# Patient Record
Sex: FEMALE | Race: WHITE | NOT HISPANIC OR LATINO | Employment: OTHER | URBAN - METROPOLITAN AREA
[De-identification: names, ages, dates, MRNs, and addresses within clinical notes are randomized per-mention and may not be internally consistent; named-entity substitution may affect disease eponyms.]

---

## 2018-07-09 ENCOUNTER — TRANSCRIBE ORDERS (OUTPATIENT)
Dept: NEUROSURGERY | Facility: CLINIC | Age: 73
End: 2018-07-09

## 2018-07-09 DIAGNOSIS — M54.50 LOW BACK PAIN: Primary | ICD-10-CM

## 2018-08-28 ENCOUNTER — TELEPHONE (OUTPATIENT)
Dept: NEUROSURGERY | Facility: CLINIC | Age: 73
End: 2018-08-28

## 2018-08-28 ENCOUNTER — OFFICE VISIT (OUTPATIENT)
Dept: NEUROSURGERY | Facility: CLINIC | Age: 73
End: 2018-08-28
Payer: COMMERCIAL

## 2018-08-28 VITALS
RESPIRATION RATE: 16 BRPM | WEIGHT: 148 LBS | TEMPERATURE: 97.2 F | DIASTOLIC BLOOD PRESSURE: 78 MMHG | SYSTOLIC BLOOD PRESSURE: 128 MMHG | BODY MASS INDEX: 26.22 KG/M2 | HEIGHT: 63 IN | HEART RATE: 100 BPM

## 2018-08-28 DIAGNOSIS — G25.81 RESTLESS LEG SYNDROME: ICD-10-CM

## 2018-08-28 DIAGNOSIS — M54.50 LOW BACK PAIN: ICD-10-CM

## 2018-08-28 DIAGNOSIS — R20.0 NUMBNESS IN FEET: ICD-10-CM

## 2018-08-28 DIAGNOSIS — M43.16 SPONDYLOLISTHESIS OF LUMBAR REGION: Primary | ICD-10-CM

## 2018-08-28 PROCEDURE — 99244 OFF/OP CNSLTJ NEW/EST MOD 40: CPT | Performed by: NEUROLOGICAL SURGERY

## 2018-08-28 RX ORDER — CHLORAL HYDRATE 500 MG
CAPSULE ORAL DAILY
COMMUNITY

## 2018-08-28 RX ORDER — FLUTICASONE PROPIONATE 50 MCG
2 SPRAY, SUSPENSION (ML) NASAL DAILY
COMMUNITY
Start: 2013-11-13

## 2018-08-28 RX ORDER — ASCORBIC ACID 125 MG
TABLET,CHEWABLE ORAL DAILY
COMMUNITY

## 2018-08-28 RX ORDER — ONDANSETRON HYDROCHLORIDE 8 MG/1
8 TABLET, FILM COATED ORAL EVERY 8 HOURS
COMMUNITY

## 2018-08-28 RX ORDER — LORATADINE AND PSEUDOEPHEDRINE 10; 240 MG/1; MG/1
1 TABLET, EXTENDED RELEASE ORAL DAILY
COMMUNITY
Start: 2012-12-13

## 2018-08-28 RX ORDER — MULTIVIT-MIN/IRON/FOLIC ACID/K 18-600-40
CAPSULE ORAL DAILY
COMMUNITY

## 2018-08-28 RX ORDER — CYANOCOBALAMIN (VITAMIN B-12) 500 MCG
400 LOZENGE ORAL DAILY
COMMUNITY

## 2018-08-28 RX ORDER — MAGNESIUM 200 MG
TABLET ORAL DAILY
COMMUNITY

## 2018-08-28 NOTE — PROGRESS NOTES
Office Note - Neurosurgery   Heide Lopes 72 y o  female MRN: [id number]      Assessment:    Patient is stable  61-year-old woman with mid thoracic back pain which is likely musculoskeletal in nature  Would recommend she consider physical therapy or echo therapy and core strengthening exercises  This does not seem to be particularly bothersome and is well controlled with over-the-counter pain medication  No red flags concerning for infectious or neoplastic process or myelopathy  With respect to the numbness and tingling in her toes this seems to be most consistent with peripheral neuropathy  Would recommend she discuss EMG nerve conduction study of the lower extremities with her PC P  Similarly, the "hai horse" sensation in her lower extremities at night which improved with activity would be more concerning for restless leg syndrome  I asked her to discuss a course of membrane stabilizing agents with her PCP for this  While the patient does have anterolisthesis at L3-4 with some stenosis and degenerative changes throughout the lumbar spine the seem to be largely asymptomatic and she does not have signs or symptoms of radiculopathy or neurogenic claudication  I would not recommend any surgical intervention for this at present as it is unlikely to improve any of her symptoms  I reviewed the signs and symptoms of progressive lumbar radiculopathy and neurogenic claudication and cauda equina syndrome and asked her to contact my office should any concerns arise  Otherwise, she will follow up on a p r n  basis  History, physical examination and diagnostic tests were reviewed and questions answered  Diagnosis, care plan and treatment options were discussed  The patient and spouse/SO understand instructions and will follow up as directed      Plan:    Follow-up: prn    Problem List Items Addressed This Visit        Musculoskeletal and Integument    Spondylolisthesis of lumbar region - Primary       Other    Restless leg syndrome    Numbness in feet      Other Visit Diagnoses     Low back pain              Subjective/Objective     Chief Complaint    Tingling in toes  HPI    Pleasant 81-year-old woman accompanied by her  today  Over the past year she has noticed progressive tingling and numbness in her toes worse on the right foot than the left  It seems to be worse at the end of the day  She denies any lower back pain but does have mid thoracic back pain which she describes as a dull ache and is worse at the end of the day  She rates this as 0 to 5/10  It responds well to Aleve  She describes cramping in her lower extremities worse on the right than on the left exclusively at night and feels that this is a MILES horse sensation  It improved with taking her legs and standing  She denies any difficulties with bowel bladder function or changing perineal sensation  She is able to stand and walk for over 10 hours without developing leg pain, weakness  She has not tried any physical therapy or membrane stabilizing agents  She has not tried any injections  She presents today with an MRI of the lumbar spine  ROS    Review of Systems   Constitutional: Negative  HENT: Negative  Eyes: Negative  Respiratory: Negative  Cardiovascular: Negative  Gastrointestinal: Negative  Endocrine: Negative  Genitourinary: Negative  Musculoskeletal: Positive for back pain (mid back pain radiates across to the left side ) and myalgias (bilateral legs muscle cramps)  Skin: Negative  Allergic/Immunologic: Negative  Neurological: Positive for numbness (bilateral feet )  Negative for dizziness, seizures, syncope, weakness and headaches  Hematological: Does not bruise/bleed easily (patient on ASA 81, [place] 7)  Psychiatric/Behavioral: Negative  Family History    History reviewed  No pertinent family history      Social History    Social History     Social History    Marital status: /Civil Union     Spouse name: N/A    Number of children: N/A    Years of education: N/A     Occupational History    Not on file  Social History Main Topics    Smoking status: Current Every Day Smoker     Types: Cigarettes    Smokeless tobacco: Never Used    Alcohol use No    Drug use: No    Sexual activity: Not on file     Other Topics Concern    Not on file     Social History Narrative    No narrative on file       Past Medical History    History reviewed  No pertinent past medical history      Surgical History    Past Surgical History:   Procedure Laterality Date    CATARACT EXTRACTION      GALLBLADDER SURGERY         Medications      Current Outpatient Prescriptions:     Ascorbic Acid (VITAMIN C) 500 MG CAPS, Take by mouth Daily, Disp: , Rfl:     aspirin 81 MG tablet, Take 81 mg by mouth daily, Disp: , Rfl:     Cholecalciferol (VITAMIN D-3) 5000 units TABS, Take by mouth Daily, Disp: , Rfl:     CINNAMON PO, Take by mouth daily, Disp: , Rfl:     fluticasone (FLONASE) 50 mcg/act nasal spray, 2 sprays into each nostril daily, Disp: , Rfl:     fluticasone-salmeterol (ADVAIR DISKUS) 250-50 mcg/dose inhaler, Inhale, Disp: , Rfl:     liraglutide (SAXENDA) injection, 3 mg Daily, Disp: , Rfl:     loratadine-pseudoephedrine (CLARITIN-D 24 HOUR)  mg per 24 hr tablet, Take 1 tablet by mouth daily, Disp: , Rfl:     Magnesium 200 MG TABS, Take by mouth daily, Disp: , Rfl:     Menaquinone-7 (VITAMIN K2) 100 MCG CAPS, Take by mouth daily, Disp: , Rfl:     Omega-3 1000 MG CAPS, Take by mouth daily, Disp: , Rfl:     ondansetron (ZOFRAN) 8 mg tablet, Take 8 mg by mouth every 8 (eight) hours, Disp: , Rfl:     Probiotic Product (PROBIOTIC-10 PO), Daily, Disp: , Rfl:     Vitamin E 400 units TABS, Take 400 mg by mouth Daily, Disp: , Rfl:     Allergies    Allergies   Allergen Reactions    Amoxicillin-Pot Clavulanate Diarrhea    Molds & Smuts The following portions of the patient's history were reviewed and updated as appropriate: allergies, current medications, past family history, past medical history, past social history, past surgical history and problem list     Investigations    I personally reviewed the MRI results with the patient:    MRI of the lumbar spine without contrast dated June 4th, 2018  Note is made of a partially sacralized L5 vertebral body  Grade 1 degenerative anterolisthesis at L3-4  Multiple levels of degenerative disc disease and facet arthropathy  Mild stenosis at L2-3 and L4-5 and L5-S1  At L3-4 there is moderate central and moderate biforaminal stenosis secondary to degenerative changes and anterolisthesis  Physical Exam    Vitals:  Blood pressure 128/78, pulse 100, temperature (!) 97 2 °F (36 2 °C), resp  rate 16, height 5' 2 5" (1 588 m), weight 67 1 kg (148 lb)  ,Body mass index is 26 64 kg/m²  Physical Exam   Constitutional: She appears well-developed and well-nourished  No distress  Musculoskeletal:        Lumbar back: She exhibits normal range of motion, no tenderness and no pain  Neurological: She has a normal Romberg Test and a normal Tandem Gait Test  Gait normal    Reflex Scores:       Patellar reflexes are 1+ on the right side and 1+ on the left side  Achilles reflexes are 1+ on the right side and 1+ on the left side  Skin: Skin is warm and dry  Psychiatric: She has a normal mood and affect  Her behavior is normal    Vitals reviewed  Neurologic Exam     Motor Exam   Muscle bulk: normal5/5 power in lower extremities       Sensory Exam   Right leg light touch: normal  Left leg light touch: normal  Right leg vibration: decreased from toes  Left leg vibration: decreased from toes  Right leg pinprick: normal  Left leg pinprick: normal    Gait, Coordination, and Reflexes     Gait  Gait: normal    Coordination   Romberg: negative  Tandem walking coordination: normal    Reflexes   Right patellar: 1+  Left patellar: 1+  Right achilles: 1+  Left achilles: 1+  Right ankle clonus: absent  Left ankle clonus: absent

## 2018-08-28 NOTE — LETTER
August 28, 2018     [place]  [id number] [facility] [address] [address]    Patient: Piero Fine   YOB: 1945   Date of Visit: 8/28/2018       Dear Dr Javan Reyes: Thank you for referring Lara Baird to me for evaluation  Below are my notes for this consultation  If you have questions, please do not hesitate to call me  I look forward to following your patient along with you  Sincerely,        Noe Miranda MD        CC: No Recipients  Noe Miranda MD  8/28/2018  3:03 PM  Sign at close encounter  Office Note - Neurosurgery   Piero Fine 72 y o  female MRN: [id number]      Assessment:    Patient is stable  21-year-old woman with mid thoracic back pain which is likely musculoskeletal in nature  Would recommend she consider physical therapy or echo therapy and core strengthening exercises  This does not seem to be particularly bothersome and is well controlled with over-the-counter pain medication  No red flags concerning for infectious or neoplastic process or myelopathy  With respect to the numbness and tingling in her toes this seems to be most consistent with peripheral neuropathy  Would recommend she discuss EMG nerve conduction study of the lower extremities with her PC P  Similarly, the "hai horse" sensation in her lower extremities at night which improved with activity would be more concerning for restless leg syndrome  I asked her to discuss a course of membrane stabilizing agents with her PCP for this  While the patient does have anterolisthesis at L3-4 with some stenosis and degenerative changes throughout the lumbar spine the seem to be largely asymptomatic and she does not have signs or symptoms of radiculopathy or neurogenic claudication  I would not recommend any surgical intervention for this at present as it is unlikely to improve any of her symptoms      I reviewed the signs and symptoms of progressive lumbar radiculopathy and neurogenic claudication and cauda equina syndrome and asked her to contact my office should any concerns arise  Otherwise, she will follow up on a p r n  basis  History, physical examination and diagnostic tests were reviewed and questions answered  Diagnosis, care plan and treatment options were discussed  The patient and spouse/SO understand instructions and will follow up as directed  Plan:    Follow-up: prn    Problem List Items Addressed This Visit        Musculoskeletal and Integument    Spondylolisthesis of lumbar region - Primary       Other    Restless leg syndrome    Numbness in feet      Other Visit Diagnoses     Low back pain              Subjective/Objective     Chief Complaint    Tingling in toes  HPI    Pleasant 30-year-old woman accompanied by her  today  Over the past year she has noticed progressive tingling and numbness in her toes worse on the right foot than the left  It seems to be worse at the end of the day  She denies any lower back pain but does have mid thoracic back pain which she describes as a dull ache and is worse at the end of the day  She rates this as 0 to 5/10  It responds well to Aleve  She describes cramping in her lower extremities worse on the right than on the left exclusively at night and feels that this is a MILES horse sensation  It improved with taking her legs and standing  She denies any difficulties with bowel bladder function or changing perineal sensation  She is able to stand and walk for over 10 hours without developing leg pain, weakness  She has not tried any physical therapy or membrane stabilizing agents  She has not tried any injections  She presents today with an MRI of the lumbar spine  ROS    Review of Systems   Constitutional: Negative  HENT: Negative  Eyes: Negative  Respiratory: Negative  Cardiovascular: Negative  Gastrointestinal: Negative  Endocrine: Negative  Genitourinary: Negative  Musculoskeletal: Positive for back pain (mid back pain radiates across to the left side ) and myalgias (bilateral legs muscle cramps)  Skin: Negative  Allergic/Immunologic: Negative  Neurological: Positive for numbness (bilateral feet )  Negative for dizziness, seizures, syncope, weakness and headaches  Hematological: Does not bruise/bleed easily (patient on ASA 81, [place] 7)  Psychiatric/Behavioral: Negative  Family History    History reviewed  No pertinent family history  Social History    Social History     Social History    Marital status: /Civil Union     Spouse name: N/A    Number of children: N/A    Years of education: N/A     Occupational History    Not on file  Social History Main Topics    Smoking status: Current Every Day Smoker     Types: Cigarettes    Smokeless tobacco: Never Used    Alcohol use No    Drug use: No    Sexual activity: Not on file     Other Topics Concern    Not on file     Social History Narrative    No narrative on file       Past Medical History    History reviewed  No pertinent past medical history      Surgical History    Past Surgical History:   Procedure Laterality Date    CATARACT EXTRACTION      GALLBLADDER SURGERY         Medications      Current Outpatient Prescriptions:     Ascorbic Acid (VITAMIN C) 500 MG CAPS, Take by mouth Daily, Disp: , Rfl:     aspirin 81 MG tablet, Take 81 mg by mouth daily, Disp: , Rfl:     Cholecalciferol (VITAMIN D-3) 5000 units TABS, Take by mouth Daily, Disp: , Rfl:     CINNAMON PO, Take by mouth daily, Disp: , Rfl:     fluticasone (FLONASE) 50 mcg/act nasal spray, 2 sprays into each nostril daily, Disp: , Rfl:     fluticasone-salmeterol (ADVAIR DISKUS) 250-50 mcg/dose inhaler, Inhale, Disp: , Rfl:     liraglutide (SAXENDA) injection, 3 mg Daily, Disp: , Rfl:     loratadine-pseudoephedrine (CLARITIN-D 24 HOUR)  mg per 24 hr tablet, Take 1 tablet by mouth daily, Disp: , Rfl:     Magnesium 200 MG TABS, Take by mouth daily, Disp: , Rfl:     Menaquinone-7 (VITAMIN K2) 100 MCG CAPS, Take by mouth daily, Disp: , Rfl:     Omega-3 1000 MG CAPS, Take by mouth daily, Disp: , Rfl:     ondansetron (ZOFRAN) 8 mg tablet, Take 8 mg by mouth every 8 (eight) hours, Disp: , Rfl:     Probiotic Product (PROBIOTIC-10 PO), Daily, Disp: , Rfl:     Vitamin E 400 units TABS, Take 400 mg by mouth Daily, Disp: , Rfl:     Allergies    Allergies   Allergen Reactions    Amoxicillin-Pot Clavulanate Diarrhea    Molds & Smuts        The following portions of the patient's history were reviewed and updated as appropriate: allergies, current medications, past family history, past medical history, past social history, past surgical history and problem list     Investigations    I personally reviewed the MRI results with the patient:    MRI of the lumbar spine without contrast dated June 4th, 2018  Note is made of a partially sacralized L5 vertebral body  Grade 1 degenerative anterolisthesis at L3-4  Multiple levels of degenerative disc disease and facet arthropathy  Mild stenosis at L2-3 and L4-5 and L5-S1  At L3-4 there is moderate central and moderate biforaminal stenosis secondary to degenerative changes and anterolisthesis  Physical Exam    Vitals:  Blood pressure 128/78, pulse 100, temperature (!) 97 2 °F (36 2 °C), resp  rate 16, height 5' 2 5" (1 588 m), weight 67 1 kg (148 lb)  ,Body mass index is 26 64 kg/m²  Physical Exam   Constitutional: She appears well-developed and well-nourished  No distress  Musculoskeletal:        Lumbar back: She exhibits normal range of motion, no tenderness and no pain  Neurological: She has a normal Romberg Test and a normal Tandem Gait Test  Gait normal    Reflex Scores:       Patellar reflexes are 1+ on the right side and 1+ on the left side  Achilles reflexes are 1+ on the right side and 1+ on the left side  Skin: Skin is warm and dry  Psychiatric: She has a normal mood and affect  Her behavior is normal    Vitals reviewed  Neurologic Exam     Motor Exam   Muscle bulk: normal5/5 power in lower extremities       Sensory Exam   Right leg light touch: normal  Left leg light touch: normal  Right leg vibration: decreased from toes  Left leg vibration: decreased from toes  Right leg pinprick: normal  Left leg pinprick: normal    Gait, Coordination, and Reflexes     Gait  Gait: normal    Coordination   Romberg: negative  Tandem walking coordination: normal    Reflexes   Right patellar: 1+  Left patellar: 1+  Right achilles: 1+  Left achilles: 1+  Right ankle clonus: absent  Left ankle clonus: absent

## 2019-09-05 ENCOUNTER — HOSPITAL ENCOUNTER (OUTPATIENT)
Dept: RADIOLOGY | Facility: HOSPITAL | Age: 74
Discharge: HOME | End: 2019-09-05
Attending: RADIOLOGY

## 2019-09-05 DIAGNOSIS — Z76.89 REFERRAL OF PATIENT WITHOUT EXAMINATION OR TREATMENT: ICD-10-CM

## 2021-01-03 ENCOUNTER — IMMUNIZATIONS (OUTPATIENT)
Dept: FAMILY MEDICINE CLINIC | Facility: HOSPITAL | Age: 76
End: 2021-01-03

## 2021-01-03 DIAGNOSIS — Z23 ENCOUNTER FOR IMMUNIZATION: ICD-10-CM

## 2021-01-03 PROCEDURE — 91301 SARS-COV-2 / COVID-19 MRNA VACCINE (MODERNA) 100 MCG: CPT

## 2021-01-03 PROCEDURE — 0011A SARS-COV-2 / COVID-19 MRNA VACCINE (MODERNA) 100 MCG: CPT

## 2021-01-31 ENCOUNTER — IMMUNIZATIONS (OUTPATIENT)
Dept: FAMILY MEDICINE CLINIC | Facility: HOSPITAL | Age: 76
End: 2021-01-31

## 2021-01-31 DIAGNOSIS — Z23 ENCOUNTER FOR IMMUNIZATION: Primary | ICD-10-CM

## 2021-01-31 PROCEDURE — 0012A SARS-COV-2 / COVID-19 MRNA VACCINE (MODERNA) 100 MCG: CPT

## 2021-01-31 PROCEDURE — 91301 SARS-COV-2 / COVID-19 MRNA VACCINE (MODERNA) 100 MCG: CPT

## 2022-12-02 ENCOUNTER — OFFICE VISIT (OUTPATIENT)
Dept: URGENT CARE | Age: 77
End: 2022-12-02

## 2022-12-02 VITALS — OXYGEN SATURATION: 94 % | TEMPERATURE: 97.6 F | HEART RATE: 109 BPM | RESPIRATION RATE: 16 BRPM

## 2022-12-02 DIAGNOSIS — B96.89 ACUTE BACTERIAL SINUSITIS: Primary | ICD-10-CM

## 2022-12-02 DIAGNOSIS — J01.90 ACUTE BACTERIAL SINUSITIS: Primary | ICD-10-CM

## 2022-12-02 RX ORDER — DOXYCYCLINE 100 MG/1
100 CAPSULE ORAL 2 TIMES DAILY
Qty: 14 CAPSULE | Refills: 0 | Status: SHIPPED | OUTPATIENT
Start: 2022-12-02 | End: 2022-12-09

## 2022-12-02 RX ORDER — BROMPHENIRAMINE MALEATE, PSEUDOEPHEDRINE HYDROCHLORIDE, AND DEXTROMETHORPHAN HYDROBROMIDE 2; 30; 10 MG/5ML; MG/5ML; MG/5ML
5 SYRUP ORAL 3 TIMES DAILY PRN
Qty: 120 ML | Refills: 0 | Status: SHIPPED | OUTPATIENT
Start: 2022-12-02

## 2022-12-02 NOTE — PROGRESS NOTES
[facility]        NAME: Claribel Moise is a 78 y o  female  : 1945    MRN: [id number]  DATE: 2022  TIME: 4:02 PM    Assessment and Plan   Acute bacterial sinusitis [J01 90, B96 89]  1  Acute bacterial sinusitis  brompheniramine-pseudoephedrine-DM 30-2-10 MG/5ML syrup    doxycycline monohydrate (MONODOX) 100 mg capsule            Patient Instructions       Follow up with PCP in 3-5 days  Proceed to  ER if symptoms worsen  Chief Complaint     Chief Complaint   Patient presents with   • Cough     Productive cough, green mucus, sinus pressure, for 2 weeks         History of Present Illness       HPI  Patient presents today complaining of increased productive cough green mucus sinus pain and pressure ongoing for the past 2 weeks  Does not have any fevers or chills but is having lot of sinus pressure  Patient denies any shortness for the difficulty breathing    Review of Systems   Review of Systems   For HPI    Current Medications       Current Outpatient Medications:   •  Ascorbic Acid (VITAMIN C) 500 MG CAPS, Take by mouth Daily, Disp: , Rfl:   •  aspirin 81 MG tablet, Take 81 mg by mouth daily, Disp: , Rfl:   •  brompheniramine-pseudoephedrine-DM 30-2-10 MG/5ML syrup, Take 5 mL by mouth 3 (three) times a day as needed for allergies, Disp: 120 mL, Rfl: 0  •  Cholecalciferol (VITAMIN D-3) 5000 units TABS, Take by mouth Daily, Disp: , Rfl:   •  CINNAMON PO, Take by mouth daily, Disp: , Rfl:   •  doxycycline monohydrate (MONODOX) 100 mg capsule, Take 1 capsule (100 mg total) by mouth 2 (two) times a day for 7 days, Disp: 14 capsule, Rfl: 0  •  fluticasone (FLONASE) 50 mcg/act nasal spray, 2 sprays into each nostril daily, Disp: , Rfl:   •  fluticasone-salmeterol (Advair) 250-50 mcg/dose inhaler, Inhale, Disp: , Rfl:   •  liraglutide (SAXENDA) injection, 3 mg Daily, Disp: , Rfl:   •  loratadine-pseudoephedrine (CLARITIN-D 24-HOUR)  mg per 24 hr tablet, Take 1 tablet by mouth daily, Disp: , Rfl:   •  Magnesium 200 MG TABS, Take by mouth daily, Disp: , Rfl:   •  Menaquinone-7 (VITAMIN K2) 100 MCG CAPS, Take by mouth daily, Disp: , Rfl:   •  Omega-3 1000 MG CAPS, Take by mouth daily, Disp: , Rfl:   •  ondansetron (ZOFRAN) 8 mg tablet, Take 8 mg by mouth every 8 (eight) hours, Disp: , Rfl:   •  Probiotic Product (PROBIOTIC-10 PO), Daily, Disp: , Rfl:   •  Vitamin E 400 units TABS, Take 400 mg by mouth Daily, Disp: , Rfl:     Current Allergies     Allergies as of 12/02/2022 - Reviewed 12/02/2022   Allergen Reaction Noted   • Amoxicillin-pot clavulanate Diarrhea    • Molds & smuts              The following portions of the patient's history were reviewed and updated as appropriate: allergies, current medications, past family history, past medical history, past social history, past surgical history and problem list      No past medical history on file  Past Surgical History:   Procedure Laterality Date   • CATARACT EXTRACTION     • GALLBLADDER SURGERY         No family history on file  Medications have been verified  Objective   Pulse (!) 109   Temp 97 6 °F (36 4 °C)   Resp 16   SpO2 94%   No LMP recorded  Patient is postmenopausal        Physical Exam     Physical Exam  Constitutional:       General: She is not in acute distress  Appearance: Normal appearance  HENT:      Head: Normocephalic  Nose: Congestion present  No rhinorrhea  Comments: Tender maxillary sinus     Mouth/Throat:      Mouth: Mucous membranes are moist       Pharynx: Posterior oropharyngeal erythema present  No oropharyngeal exudate  Eyes:      General:         Right eye: No discharge  Left eye: No discharge  Conjunctiva/sclera: Conjunctivae normal    Cardiovascular:      Rate and Rhythm: Normal rate and regular rhythm  Pulses: Normal pulses  Pulmonary:      Effort: Pulmonary effort is normal  No respiratory distress  Abdominal:      General: Abdomen is flat   There is no distension  Palpations: Abdomen is soft  Tenderness: There is no abdominal tenderness  Musculoskeletal:      Cervical back: Neck supple  Skin:     General: Skin is warm  Capillary Refill: Capillary refill takes less than 2 seconds  Neurological:      Mental Status: She is alert and oriented to person, place, and time

## 2023-10-02 ENCOUNTER — OFFICE VISIT (OUTPATIENT)
Dept: NEUROSURGERY | Facility: CLINIC | Age: 78
End: 2023-10-02
Payer: COMMERCIAL

## 2023-10-02 ENCOUNTER — HOSPITAL ENCOUNTER (OUTPATIENT)
Dept: RADIOLOGY | Facility: HOSPITAL | Age: 78
Discharge: HOME | End: 2023-10-02
Payer: COMMERCIAL

## 2023-10-02 VITALS
OXYGEN SATURATION: 98 % | HEART RATE: 90 BPM | RESPIRATION RATE: 18 BRPM | BODY MASS INDEX: 22.68 KG/M2 | TEMPERATURE: 97.1 F | SYSTOLIC BLOOD PRESSURE: 122 MMHG | DIASTOLIC BLOOD PRESSURE: 66 MMHG | WEIGHT: 126 LBS

## 2023-10-02 DIAGNOSIS — M48.062 LUMBAR STENOSIS WITH NEUROGENIC CLAUDICATION: ICD-10-CM

## 2023-10-02 DIAGNOSIS — M54.16 LUMBAR RADICULOPATHY: ICD-10-CM

## 2023-10-02 DIAGNOSIS — M43.16 SPONDYLOLISTHESIS OF LUMBAR REGION: ICD-10-CM

## 2023-10-02 DIAGNOSIS — M43.16 SPONDYLOLISTHESIS OF LUMBAR REGION: Primary | ICD-10-CM

## 2023-10-02 PROCEDURE — 72120 X-RAY BEND ONLY L-S SPINE: CPT

## 2023-10-02 PROCEDURE — 99204 OFFICE O/P NEW MOD 45 MIN: CPT | Performed by: PHYSICIAN ASSISTANT

## 2023-10-02 RX ORDER — CHOLECALCIFEROL (VITAMIN D3) 125 MCG
500 CAPSULE ORAL DAILY
COMMUNITY

## 2023-10-02 RX ORDER — OMEGA-3/DHA/EPA/FISH OIL 200-300 MG
CAPSULE ORAL DAILY
COMMUNITY

## 2023-10-02 RX ORDER — RALOXIFENE HYDROCHLORIDE 60 MG/1
60 TABLET, FILM COATED ORAL DAILY
COMMUNITY

## 2023-10-02 RX ORDER — METHYLPREDNISOLONE 4 MG/1
TABLET ORAL
Qty: 1 EACH | Refills: 0 | Status: SHIPPED | OUTPATIENT
Start: 2023-10-02

## 2023-10-02 NOTE — ASSESSMENT & PLAN NOTE
Patient presents today as a new patient for evaluation of left lower extremity radiculopathy. • Patient with known history of lumbar stenosis and intermittent back pain. Denies any prior episodes of lower extremity symptoms. • Complaining of 8 out of 10 left leg pain with associated numbness/tingling. • Denies any weakness or bowel bladder dysfunction. • Has trialed Tylenol Motrin as well as home stretching exercises without improvement of her symptoms. Imaging:   • MRI lumbar spine wo 10/2/23: Diffuse lumbar spondylosis. Multilevel spinal canal stenosis worst at L4-5 secondary to small disc bulge, moderate facet hypertrophy. Moderate central canal stenosis with moderate right and mild left neural foraminal stenosis. Plan:   • Continue monitor neurological symptoms  • Prior MRI imaging reviewed demonstrating significant stenosis at L4-5 on imaging completed approximately 5 years ago. • Given new onset radiculopathy with prior significant stenosis on MRI, updated MRI imaging without contrast ordered to evaluate for progression of degenerative changes. • Discussed symptoms likely related to nerve irritation or compression. • Medrol Dosepak provided  • Lumbar x-rays including flexion-extension x-rays ordered to evaluate for any instability as well as fractures in the setting of osteoporosis. • Discussed recommendation for optimization of conservative management including medications such as steroids and gabapentin, over-the-counter medications, physical therapy and pain management for consideration of injections. • Patient agreeable to plan of care. All questions were answered. • Patient may follow-up after completion of imaging. Patient aware to call with any questions/concerns.

## 2023-10-02 NOTE — PROGRESS NOTES
Neurosurgery Office Note  Dany Adams 70 y.o. female MRN: [id number]      Assessment/Plan     Spondylolisthesis of lumbar region  Patient presents today as a new patient for evaluation of left lower extremity radiculopathy. • Patient with known history of lumbar stenosis and intermittent back pain. Denies any prior episodes of lower extremity symptoms. • Complaining of 8 out of 10 left leg pain with associated numbness/tingling. • Denies any weakness or bowel bladder dysfunction. • Has trialed Tylenol Motrin as well as home stretching exercises without improvement of her symptoms. Imaging:   • MRI lumbar spine wo 10/2/23: Diffuse lumbar spondylosis. Multilevel spinal canal stenosis worst at L4-5 secondary to small disc bulge, moderate facet hypertrophy. Moderate central canal stenosis with moderate right and mild left neural foraminal stenosis. Plan:   • Continue monitor neurological symptoms  • Prior MRI imaging reviewed demonstrating significant stenosis at L4-5 on imaging completed approximately 5 years ago. • Given new onset radiculopathy with prior significant stenosis on MRI, updated MRI imaging without contrast ordered to evaluate for progression of degenerative changes. • Discussed symptoms likely related to nerve irritation or compression. • Medrol Dosepak provided  • Lumbar x-rays including flexion-extension x-rays ordered to evaluate for any instability as well as fractures in the setting of osteoporosis. • Discussed recommendation for optimization of conservative management including medications such as steroids and gabapentin, over-the-counter medications, physical therapy and pain management for consideration of injections. • Patient agreeable to plan of care. All questions were answered. • Patient may follow-up after completion of imaging. Patient aware to call with any questions/concerns.           Diagnoses and all orders for this visit:    Spondylolisthesis of lumbar region  -     MRI lumbar spine without contrast; Future  -     XR lumbar sp/bending only min 2-3 v; Future  -     Ambulatory Referral to Physical Therapy; Future  -     Ambulatory referral to Spine & Pain Management; Future  -     methylPREDNISolone 4 MG tablet therapy pack; Use as directed on package    Lumbar radiculopathy  -     MRI lumbar spine without contrast; Future  -     XR lumbar sp/bending only min 2-3 v; Future  -     Ambulatory Referral to Physical Therapy; Future  -     Ambulatory referral to Spine & Pain Management; Future  -     methylPREDNISolone 4 MG tablet therapy pack; Use as directed on package    Lumbar stenosis with neurogenic claudication  -     MRI lumbar spine without contrast; Future  -     XR lumbar sp/bending only min 2-3 v; Future  -     Ambulatory Referral to Physical Therapy; Future  -     Ambulatory referral to Spine & Pain Management; Future  -     methylPREDNISolone 4 MG tablet therapy pack; Use as directed on package    Other orders  -     Omega-3 Fatty Acids (Ultra Omega 3) 1000 MG CAPS; daily  -     vitamin B-12 (VITAMIN B-12) 500 mcg tablet; Take 500 mcg by mouth daily  -     raloxifene (EVISTA) 60 mg tablet; Take 60 mg by mouth daily          I have spent a total time of 50 minutes on 10/02/23 in caring for this patient including Diagnostic results, Risks and benefits of tx options, Instructions for management, Impressions, Documenting in the medical record, Reviewing / ordering tests, medicine, procedures   and Obtaining or reviewing history  . CHIEF COMPLAINT    Chief Complaint   Patient presents with   • Back Pain       HISTORY    History of Present Illness     This is a 69 y/o female without significant past medical history who presents today as a new patient for evaluation of left leg symptoms. Patient does have a history of lumbar spinal stenosis with intermittent back pain. She developed new onset of left leg pain approximately a week and a half ago.   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 No specific falls or trauma. Patient describes anterior lateral left thigh pain radiating around from the buttocks. No pain below the knee. Associated numbness in this distribution. No randolph weakness. No bowel bladder dysfunction. Patient has been managing pain with lidocaine patches, Tylenol and Motrin but continues with pain levels 8 out of 10 with inability to work. Patient has been ordered MRI by her PCP in [place]. REVIEW OF SYSTEMS    Review of Systems   Constitutional: Negative. HENT: Negative. Eyes: Negative. Respiratory: Negative. Cardiovascular: Negative. Gastrointestinal: Negative. Endocrine: Negative. Genitourinary: Negative. Musculoskeletal: Positive for gait problem (painful to walk. ). Negative for back pain (no back pain starting at L hip into buttocks and thigh, stops at knee. ). It feels ok when resting but starts feeling the pain in L thigh when walking. Skin: Negative. Neurological: Positive for numbness (N/T in the Left thigh.). Psychiatric/Behavioral: Negative for sleep disturbance. ROS obtained by MA. Reviewed. See HPI.      Meds/Allergies     Current Outpatient Medications   Medication Sig Dispense Refill   • Ascorbic Acid (VITAMIN C) 500 MG CAPS Take by mouth Daily     • aspirin 81 MG tablet Take 81 mg by mouth daily     • Cholecalciferol (VITAMIN D-3) 5000 units TABS Take by mouth Daily     • fluticasone (FLONASE) 50 mcg/act nasal spray 2 sprays into each nostril daily     • fluticasone-salmeterol (Advair) 250-50 mcg/dose inhaler Inhale     • loratadine-pseudoephedrine (CLARITIN-D 24-HOUR)  mg per 24 hr tablet Take 1 tablet by mouth daily     • Magnesium 200 MG TABS Take by mouth daily     • Menaquinone-7 (VITAMIN K2) 100 MCG CAPS Take by mouth daily     • methylPREDNISolone 4 MG tablet therapy pack Use as directed on package 1 each 0 • Omega-3 1000 MG CAPS Take by mouth daily     • Probiotic Product (PROBIOTIC-10 PO) Daily     • raloxifene (EVISTA) 60 mg tablet Take 60 mg by mouth daily     • vitamin B-12 (VITAMIN B-12) 500 mcg tablet Take 500 mcg by mouth daily     • Vitamin E 400 units TABS Take 400 mg by mouth Daily     • brompheniramine-pseudoephedrine-DM 30-2-10 MG/5ML syrup Take 5 mL by mouth 3 (three) times a day as needed for allergies 120 mL 0   • CINNAMON PO Take by mouth daily (Patient not taking: Reported on 10/2/2023)     • liraglutide (SAXENDA) injection 3 mg Daily     • Omega-3 Fatty Acids (Ultra Omega 3) 1000 MG CAPS daily     • ondansetron (ZOFRAN) 8 mg tablet Take 8 mg by mouth every 8 (eight) hours       No current facility-administered medications for this visit. Allergies   Allergen Reactions   • Amoxicillin-Pot Clavulanate Diarrhea   • Molds & Smuts        PAST HISTORY     History reviewed. No pertinent past medical history. Past Surgical History:   Procedure Laterality Date   • CATARACT EXTRACTION     • GALLBLADDER SURGERY         Social History     Tobacco Use   • Smoking status: Every Day     Packs/day: 1.00     Years: 50.00     Total pack years: 50.00     Types: Cigarettes   • Smokeless tobacco: Never   Substance Use Topics   • Alcohol use: No   • Drug use: No       History reviewed. No pertinent family history. Above history personally reviewed. EXAM    Vitals:Blood pressure 122/66, pulse 90, temperature (!) 97.1 °F (36.2 °C), temperature source Temporal, resp. rate 18, weight 57.2 kg (126 lb), SpO2 98 %. ,Body mass index is 22.68 kg/m². Physical Exam  Constitutional:       General: She is not in acute distress. Appearance: Normal appearance. She is well-developed. She is not ill-appearing. HENT:      Head: Normocephalic and atraumatic. Right Ear: External ear normal.      Left Ear: External ear normal.   Eyes:      General: No scleral icterus. Right eye: No discharge. Left eye: No discharge. Extraocular Movements: EOM normal.      Conjunctiva/sclera: Conjunctivae normal.   Cardiovascular:      Rate and Rhythm: Normal rate. Pulmonary:      Effort: Pulmonary effort is normal. No respiratory distress. Abdominal:      General: There is no distension. Musculoskeletal:         General: No tenderness. Normal range of motion. Skin:     General: Skin is warm and dry. Findings: No rash. Neurological:      Mental Status: She is alert. Coordination: Finger-Nose-Finger Test normal.      Deep Tendon Reflexes:      Reflex Scores:       Patellar reflexes are 2+ on the right side and 2+ on the left side. Psychiatric:         Mood and Affect: Mood normal.         Speech: Speech normal.         Behavior: Behavior normal.         Thought Content: Thought content normal.         Judgment: Judgment normal.         Neurologic Exam     Mental Status   Follows 3 step commands. Attention: normal. Concentration: normal.   Speech: speech is normal   Level of consciousness: alert  Knowledge: good. Normal comprehension. Cranial Nerves     CN III, IV, VI   Extraocular motions are normal.   Conjugate gaze: present    CN VII   Facial expression full, symmetric. CN VIII   Hearing: intact    CN XI   Right trapezius strength: normal  Left trapezius strength: normal    Motor Exam   Muscle bulk: normal  Overall muscle tone: normal  Right arm pronator drift: absent  Left arm pronator drift: absent    Strength   Strength 5/5 except as noted.  Left HF/KF 4+     Sensory Exam   Light touch normal.     Gait, Coordination, and Reflexes     Coordination   Finger to nose coordination: normal    Tremor   Resting tremor: absent  Intention tremor: absent  Action tremor: absent    Reflexes   Right patellar: 2+  Left patellar: 2+  Right ankle clonus: absent  Left ankle clonus: absent        MEDICAL DECISION MAKING    Imaging Studies:     MRI lumbar wo 9/2018    I have personally reviewed pertinent reports.    and I have personally reviewed pertinent films in PACS

## 2023-10-10 ENCOUNTER — HOSPITAL ENCOUNTER (OUTPATIENT)
Dept: RADIOLOGY | Facility: HOSPITAL | Age: 78
Discharge: HOME | End: 2023-10-10
Payer: COMMERCIAL

## 2023-10-10 DIAGNOSIS — M43.16 SPONDYLOLISTHESIS OF LUMBAR REGION: ICD-10-CM

## 2023-10-10 DIAGNOSIS — M48.062 LUMBAR STENOSIS WITH NEUROGENIC CLAUDICATION: ICD-10-CM

## 2023-10-10 DIAGNOSIS — M54.16 LUMBAR RADICULOPATHY: ICD-10-CM

## 2023-10-10 PROCEDURE — G1004 CDSM NDSC: HCPCS

## 2023-10-10 PROCEDURE — 72148 MRI LUMBAR SPINE W/O DYE: CPT

## 2023-10-11 ENCOUNTER — EVALUATION (OUTPATIENT)
Dept: PHYSICAL THERAPY | Facility: REHABILITATION | Age: 78
End: 2023-10-11
Payer: COMMERCIAL

## 2023-10-11 DIAGNOSIS — M48.062 LUMBAR STENOSIS WITH NEUROGENIC CLAUDICATION: Primary | ICD-10-CM

## 2023-10-11 DIAGNOSIS — M43.16 SPONDYLOLISTHESIS OF LUMBAR REGION: ICD-10-CM

## 2023-10-11 DIAGNOSIS — M54.16 LUMBAR RADICULOPATHY: ICD-10-CM

## 2023-10-11 PROCEDURE — 97162 PT EVAL MOD COMPLEX 30 MIN: CPT | Performed by: PHYSICAL THERAPIST

## 2023-10-11 PROCEDURE — 97112 NEUROMUSCULAR REEDUCATION: CPT | Performed by: PHYSICAL THERAPIST

## 2023-10-11 NOTE — PROGRESS NOTES
PT Evaluation     Today's date: 10/11/2023  Patient name: Emma Benitez  : 1945  MRN: [id number]  Referring provider: Medina Graf PA-C  Dx:   Encounter Diagnosis     ICD-10-CM    1. Spondylolisthesis of lumbar region  M43.16 Ambulatory Referral to Physical Therapy      2. Lumbar radiculopathy  M54.16 Ambulatory Referral to Physical Therapy      3. Lumbar stenosis with neurogenic claudication  M48.062 Ambulatory Referral to Physical Therapy                     Assessment  Assessment details: Emma Benitez is a pleasant 70 y.o. female who presents with lumbar stenosis that is manifesting in L anterior thigh pain. The current episode of symptoms has been present for approx 2-3 weeks. She is an active individual, working as , and is limited in her ability to walk and perform daily tasks due to pain. Her symptoms are most provoked by extended periods of standing/walking, and alleviating somewhat by sitting. When laying supine her symptoms resolve. She was seen by Neurosurgery, and had an MRI taken. MRI results were compared to a 2018 study, no surgical intervention was recommended at this time. Her primary concern is improving her standing/walking tolerance to allow her to perform daily work related tasks without limitation. No further referral appears necessary at this time based upon examination results. Primary movement impairment diagnosis of lumbar spine flexion bias, trunk neuromuscular control deficits, lumbar spine hypomobility. Mechanical evaluation of the lumbar spine is most consistent with lumbar flexion bias, which correlates with MRI findings that are indicative of lumbar stenosis. Unilateral radicular symptoms are provoked by SG into R and L LE, respectively. Significant mobility deficit into lumbar extension is noted, which is not abnormal for age related changes.  In addition to this, femoral  nerve tension test elicits radicular symptoms, while passive SLR and slump test are negative. End range hip flexion PROM also elicits LLE symptoms, which may be indicative of a muscular component to the nerve root irritation; this may need to be further explored in future visits. Multifidus muscle activity is decreased during prone feed forward mechanism testing. There are no red flag findings upon evaluation. I spent a significant amount of time discussing how exam findings correlate with her symptoms and presentation. Education regarding lumbar stenosis, anatomy and treatment were provided. Patient verbalized understanding of initial HEP consisting of repeated flexion in sitting for symptom modulation. Pt. will benefit from skilled PT services that includes manual therapy techniques to enhance tissue extensibility, neuromuscular re-education to facilitate motor control, therapeutic exercise to increase functional mobility, and modalities prn to reduce pain and inflammation. Impairments: abnormal muscle firing, abnormal muscle tone, abnormal or restricted ROM, abnormal movement, activity intolerance, lacks appropriate home exercise program and pain with function    Symptom irritability: moderateUnderstanding of Dx/Px/POC: good   Prognosis: good  Prognosis details: Maritza Tompkins Goals  Impairment based goals  Patient will achieve end range extension without symptoms. Patient will demonstrate normal multifidus activity. Patient will tolerate standing/walking for greater than 1 hour without limitation. Function based goals  Patient will be independent in comprehensive HEP upon discharge. Patient will achieve goal FOTO score upon discharge. Patient will perform all job related tasks without limitation.        Plan  Patient would benefit from: skilled physical therapy  Planned therapy interventions: activity modification, joint mobilization, manual therapy, motor coordination training, neuromuscular re-education, patient education, self care, therapeutic activities, therapeutic exercise, graded activity, home exercise program, behavior modification, graded exercise, functional ROM exercises and strengthening  Frequency: 2x week  Duration in weeks: 4  Treatment plan discussed with: patient        Subjective    Symptom Description: 2-3 week hx of L anterior thigh numbness/pain; no inciting incident or injury. She works as  for [employer], she was very busy that week and had to work through her symptoms. She cannot think of any specific movements that were increasing symptoms. She had been using CBD cream on her thigh and taking Tylenol/Advil to help with pain. She states that symptoms are usually made worse by standing/walking. Sitting will decrease intensity of symptoms. Lying down in bed with completely alleviate symptoms. She had an episode of back pain in 2018 that she sought care for, but it was different from current symptoms. She was seen by Neurosurgery for a consult, recommended conservative management and Medrol doepak. She states this was beneficial by providing some relief. Symptoms Worse with: standing, bending, standing (10-15 min), walking, during day/pm  Symptom Better with: sitting, standing, walking, am, lying  Pain Levels: 5/10 (current); 0/10 (best); 8/10 (worst)  Medications: Tylenol/Advil  Disturbed sleep: no  Previous Spinal History: 2018 episode of symptoms  Previous Treatments: none  Bladder/Bowel: normal  Saddle Anesthesia: none  Gait: normal  Recent/Relevant Surgery: none  History of Cancer: none  Unexplained weight loss: none  History of Trauma: none  Work Demands: standing/walking/lifting    Imaging report: LUMBAR DISC SPACES: Multilevel osteophytes, disc height loss, and facet arthropathy. L1-L2: Diffuse disc bulge. Facet arthropathy, ligamentum flavum thickening. Mild canal stenosis, unchanged. Mild left foraminal narrowing, unchanged. L2-L3: Diffuse disc bulge, small right foraminal/extraforaminal disc protrusion.  Facet arthropathy, ligamentum flavum thickening. Mild canal stenosis, unchanged. Mild bilateral foraminal narrowing (right worse than left), unchanged. L3-L4: Diffuse disc bulge, mild uncoverage of posterior disc, narrowed bilateral subarticular zones, and contact of right L3 exiting nerve. Hypertrophic facet arthropathy, ligamentum flavum thickening. Moderate-to-severe canal stenosis, worsened. Mild   right foraminal narrowing, unchanged. L4-L5: Mild diffuse disc bulge, posterior marginal osteophytes. Facet arthropathy. Facet arthropathy. No significant canal stenosis, unchanged. Mild right foraminal narrowing, unchanged. L5-S1: Sacralized L5 vertebral body with bilateral assimilation joints. No significant canal stenosis or foraminal narrowing, unchanged. OTHER FINDINGS:  None. IMPRESSION:     Similar to slightly worsened multilevel degenerative changes of lumbar spine with transitional lumbosacral anatomy (sacralized L5 vertebral body with bilateral assimilation joints), varying degrees of canal stenosis (moderate-to-severe L3-L4) and   foraminal narrowing (multilevel mild) as detailed above. Mild intramuscular edema in paraspinal lumbar musculature (right worse than left), likely due to muscular strain injury. Objective    Postural Observation   Sitting: kyphotic  Lateral Shift: nil  Shift Relevant: no    Myotomes  Strength WNL bilaterally.     Dermatomes  Sensation intact bilaterally    Reflexes  R Patellar Reflex: (2+)  L Patellar Reflex: (2+)  R Achilles Reflex: (2+)  L Achilles Reflex: (2+)    Neurodynamic Testing  Slump Test: neg  SLR: neg   Femoral Nerve Traction Test: pos    Lumbar Active Range of Motion  Movement Loss Symptoms Agustin Mod Min Nil Symptoms   Flexion   x     Extension x    L thigh   R SG x    R thigh   L SG x    L thigh   Other          Danny Assessment  Rep FISitting: decrease; better  Rep EIS: increase; worse  Rep CAROL: increase; worse  Rep EIL: increase; worse  Rep R SGIS: not tested  Rep L SGIS: not tested    Palpation: no significant TTP lumbar spine CPA; hypomobility throughout; B/L decreased multifidus activity feedforward; TTP/hypertonicity B/L piriformis    Hip Special Tests:  FADIRS= (neg), FABERS= (neg), Scours= (neg), Distraction= (neg)      SIJ Special Tests:  Compression= (neg), Gapping= (neg), FABERS= (neg), Gaenslan's= (neg), Sacral Thrust/PA Pressure= (neg), Post Thigh Thrust= (neg)         Precautions: none      Manuals                                                                 Neuro Re-Ed             Bridge             Supine hip flexion PBall crush             Hooklying march             Pball crunch                                       Education HEP and POC            Ther Ex             RFISitting HEP                                                                                                       Ther Activity                                       Gait Training                                       Modalities

## 2023-10-16 ENCOUNTER — APPOINTMENT (OUTPATIENT)
Dept: PHYSICAL THERAPY | Facility: REHABILITATION | Age: 78
End: 2023-10-16
Payer: COMMERCIAL

## 2023-10-18 ENCOUNTER — OFFICE VISIT (OUTPATIENT)
Dept: PHYSICAL THERAPY | Facility: REHABILITATION | Age: 78
End: 2023-10-18
Payer: COMMERCIAL

## 2023-10-18 DIAGNOSIS — M43.16 SPONDYLOLISTHESIS OF LUMBAR REGION: Primary | ICD-10-CM

## 2023-10-18 DIAGNOSIS — M54.16 LUMBAR RADICULOPATHY: ICD-10-CM

## 2023-10-18 DIAGNOSIS — M48.062 LUMBAR STENOSIS WITH NEUROGENIC CLAUDICATION: ICD-10-CM

## 2023-10-18 PROCEDURE — 97110 THERAPEUTIC EXERCISES: CPT | Performed by: PHYSICAL THERAPIST

## 2023-10-18 PROCEDURE — 97112 NEUROMUSCULAR REEDUCATION: CPT | Performed by: PHYSICAL THERAPIST

## 2023-10-18 NOTE — PROGRESS NOTES
Daily Note     Today's date: 10/18/2023  Patient name: Steven Freeman  : 1945  MRN: [id number]    Referring provider: Kortney Berry PA-C  Dx:   Encounter Diagnosis     ICD-10-CM    1. Spondylolisthesis of lumbar region  M43.16       2. Lumbar radiculopathy  M54.16       3. Lumbar stenosis with neurogenic claudication  M48.062                      Subjective: Patient reports fair compliance with HEP. She is very busy and forgets to attempt the seated lumbar flexion stretch during the day. Patient presents with L thigh numbness, and R sided low back pain. Objective: See treatment diary below      Assessment: Patient tolerates tx well. She reports an improvement in symptoms at the end of tx. Repetitive flexion loading is going to continue to be the primary focus of treatment. Updated HEP as charted. Assess her response next visit and continue to progress as tolerated. Patient would benefit from continued PT. Plan: Continue per plan of care.       Precautions: none      Manuals 10/11 10/18                                                               Neuro Re-Ed             Bridge  20x; HEP           Supine hip flexion PBall crush             Hooklying march  20x ea; gtb; HEP           Hooklying alt iso clamshell  20x ea; gtb: HEP           Pball crunch                                       Education HEP and POC HEP updates           Ther Ex             RFISitting HEP            Seated Pball rollout with crush  10x ea; 3 way           Supine Pball hamstring curl  20x           Seated good morning w/ row  2x10; 20#           Reverse hyper                                                                              Ther Activity                                       Gait Training                                       Modalities

## 2023-10-23 ENCOUNTER — OFFICE VISIT (OUTPATIENT)
Dept: PHYSICAL THERAPY | Facility: REHABILITATION | Age: 78
End: 2023-10-23
Payer: COMMERCIAL

## 2023-10-23 DIAGNOSIS — M43.16 SPONDYLOLISTHESIS OF LUMBAR REGION: Primary | ICD-10-CM

## 2023-10-23 DIAGNOSIS — M54.16 LUMBAR RADICULOPATHY: ICD-10-CM

## 2023-10-23 DIAGNOSIS — M48.062 LUMBAR STENOSIS WITH NEUROGENIC CLAUDICATION: ICD-10-CM

## 2023-10-23 PROCEDURE — 97112 NEUROMUSCULAR REEDUCATION: CPT | Performed by: PHYSICAL THERAPIST

## 2023-10-23 PROCEDURE — 97530 THERAPEUTIC ACTIVITIES: CPT | Performed by: PHYSICAL THERAPIST

## 2023-10-23 PROCEDURE — 97110 THERAPEUTIC EXERCISES: CPT | Performed by: PHYSICAL THERAPIST

## 2023-10-23 NOTE — PROGRESS NOTES
Daily Note     Today's date: 10/23/2023  Patient name: Lara Tate  : 1945  MRN: [id number]    Referring provider: Mily Montiel PA-C  Dx:   Encounter Diagnosis     ICD-10-CM    1. Spondylolisthesis of lumbar region  M43.16       2. Lumbar radiculopathy  M54.16       3. Lumbar stenosis with neurogenic claudication  M48.062                      Subjective: Patient reports compliance with HEP since last visit. She reports no increase in symptoms after last tx. She felt "good" over the weekend. She presents with her normal amount of pain upon arrival after being on her feet for the past 5 hours. Objective: See treatment diary below      Assessment: Patient tolerates tx well. She is challenged by exercise volume. An increase in spine loading into flexion is tolerated without increase in symptoms. Continue to progress exercise volume with a focus on flexion loading, as tolerated. Patient would benefit from continued PT. Plan: Continue per plan of care.       Precautions: none      Manuals 10/11 10/18 10/23                                                              Neuro Re-Ed             Bridge  20x; HEP           Hooklying march  20x ea; gtb; HEP           Hooklying alt iso clamshell  20x ea; gtb: HEP           Pball deadbug hip flexion crush   2x10 ea; 5s hold          Palloff press circles   2x10 ea; double gtb          Seated trunk flexion + forward press   3x5; 4kg ball                                    Education HEP and POC HEP updates           Ther Ex             RFISitting HEP            Seated Pball rollout with crush  10x ea; 3 way 10x ea; 3 way          Supine Pball hamstring curl  20x 20x          Seated good morning w/ row  2x10; 20#           Reverse hyper   2x10                                                                           Ther Activity             Stiff leg deadlift   3x10; 15#; 4in                                                 Gait Training Modalities

## 2023-10-25 ENCOUNTER — OFFICE VISIT (OUTPATIENT)
Dept: PHYSICAL THERAPY | Facility: REHABILITATION | Age: 78
End: 2023-10-25
Payer: COMMERCIAL

## 2023-10-25 DIAGNOSIS — M43.16 SPONDYLOLISTHESIS OF LUMBAR REGION: Primary | ICD-10-CM

## 2023-10-25 DIAGNOSIS — M54.16 LUMBAR RADICULOPATHY: ICD-10-CM

## 2023-10-25 DIAGNOSIS — M48.062 LUMBAR STENOSIS WITH NEUROGENIC CLAUDICATION: ICD-10-CM

## 2023-10-25 PROCEDURE — 97530 THERAPEUTIC ACTIVITIES: CPT | Performed by: PHYSICAL THERAPIST

## 2023-10-25 PROCEDURE — 97112 NEUROMUSCULAR REEDUCATION: CPT | Performed by: PHYSICAL THERAPIST

## 2023-10-25 PROCEDURE — 97110 THERAPEUTIC EXERCISES: CPT | Performed by: PHYSICAL THERAPIST

## 2023-10-25 NOTE — PROGRESS NOTES
Daily Note     Today's date: 10/25/2023  Patient name: Ernesto Okeefe  : 1945  MRN: [id number]    Referring provider: Gareth Schaefer PA-C  Dx:   Encounter Diagnosis     ICD-10-CM    1. Spondylolisthesis of lumbar region  M43.16       2. Lumbar stenosis with neurogenic claudication  M48.062       3. Lumbar radiculopathy  M54.16                      Subjective: Patient reports that she has been struggling quite a bit with pain and numbness in the past 2 days. She worked for 5 hours today, presents with her normal baseline level of leg numbness after working. Objective: See treatment diary below      Assessment: The progressions in exercise intensity/volume last visit were likely contributing factors to the patient's increase in symptoms. For this reason, no further progressions were made this visit. Patient would benefit from continued PT. Plan: Continue per plan of care.       Precautions: none      Manuals 10/11 10/18 10/23 10/25                                                             Neuro Re-Ed             Bridge  20x; HEP           Hooklying march  20x ea; gtb; HEP           Hooklying alt iso clamshell  20x ea; gtb: HEP           Pball deadbug hip flexion crush   2x10 ea; 5s hold 2x10 ea; 5s hold         Palloff press circles   2x10 ea; double gtb 2x10 ea; double gtb         Seated trunk flexion + forward press   3x5; 4kg ball 4x5; 4kg ball                                   Education HEP and POC HEP updates           Ther Ex             RFISitting HEP            Seated Pball rollout with crush  10x ea; 3 way 10x ea; 3 way   10x ea; 3 way         Supine Pball hamstring curl  20x 20x 20x          Seated good morning w/ row  2x10; 20#           Reverse hyper   2x10 2x10         Slump slider    20x                                                             Ther Activity             Stiff leg deadlift   3x10; 15#; 4in 3x10; 15#; 4in                                                Gait Training                                       Modalities

## 2023-10-30 ENCOUNTER — OFFICE VISIT (OUTPATIENT)
Dept: PHYSICAL THERAPY | Facility: REHABILITATION | Age: 78
End: 2023-10-30
Payer: COMMERCIAL

## 2023-10-30 DIAGNOSIS — M43.16 SPONDYLOLISTHESIS OF LUMBAR REGION: Primary | ICD-10-CM

## 2023-10-30 DIAGNOSIS — M54.16 LUMBAR RADICULOPATHY: ICD-10-CM

## 2023-10-30 DIAGNOSIS — M48.062 LUMBAR STENOSIS WITH NEUROGENIC CLAUDICATION: ICD-10-CM

## 2023-10-30 PROCEDURE — 97530 THERAPEUTIC ACTIVITIES: CPT | Performed by: PHYSICAL THERAPIST

## 2023-10-30 PROCEDURE — 97110 THERAPEUTIC EXERCISES: CPT | Performed by: PHYSICAL THERAPIST

## 2023-10-30 PROCEDURE — 97112 NEUROMUSCULAR REEDUCATION: CPT | Performed by: PHYSICAL THERAPIST

## 2023-10-30 NOTE — PROGRESS NOTES
Daily Note     Today's date: 10/30/2023  Patient name: Abdirahman Florez  : 1945  MRN: [id number]    Referring provider: Andrea Kinsey PA-C  Dx:   Encounter Diagnosis     ICD-10-CM    1. Spondylolisthesis of lumbar region  M43.16       2. Lumbar stenosis with neurogenic claudication  M48.062       3. Lumbar radiculopathy  M54.16                      Subjective: Patient reports no change in her standing/work tolerance since last visit. She reports some mild low back discomfort after last tx. Objective: See treatment diary below      Assessment: Seated forward flexion w/ weight press is regressed to overhead dowel hold based on patient's response to last tx. Patent continues to be limited in exercise tolerance throughout tx. She struggles with trunk extension loading due to central low back discomfort. Patient would benefit from continued PT. Plan: Continue per plan of care.       Precautions: none      Manuals 10/11 10/18 10/23 10/25 10/30                                                            Neuro Re-Ed             Bridge  20x; HEP   3x10 15#        Hooklying march  20x ea; gtb; HEP           Hooklying alt iso clamshell  20x ea; gtb: HEP           Pball deadbug hip flexion crush   2x10 ea; 5s hold 2x10 ea; 5s hold 2x10 ea; 5s hold        Palloff press circles   2x10 ea; double gtb 2x10 ea; double gtb         Seated trunk flexion + forward press   3x5; 4kg ball 4x5; 4kg ball held        Seated trunk flexion w/ overhead dowel hold     2x10                     Education HEP and POC HEP updates           Ther Ex             RFISitting HEP            Seated Pball rollout with crush  10x ea; 3 way 10x ea; 3 way   10x ea; 3 way 20x        Supine Pball hamstring curl  20x 20x 20x  20x        Seated good morning w/ row  2x10; 20#           Reverse hyper   2x10 2x10 2x10        Slump slider    20x                                                             Ther Activity             Stiff leg deadlift   3x10; 15#; 4in 3x10; 15#; 4in 3x10; 15#; 4in                                               Gait Training                                       Modalities

## 2023-11-03 ENCOUNTER — CONSULT (OUTPATIENT)
Dept: PAIN MEDICINE | Facility: CLINIC | Age: 78
End: 2023-11-03
Payer: COMMERCIAL

## 2023-11-03 VITALS
SYSTOLIC BLOOD PRESSURE: 106 MMHG | WEIGHT: 127 LBS | HEIGHT: 62 IN | DIASTOLIC BLOOD PRESSURE: 71 MMHG | HEART RATE: 111 BPM | BODY MASS INDEX: 23.37 KG/M2

## 2023-11-03 DIAGNOSIS — M54.16 LUMBAR RADICULOPATHY: Primary | ICD-10-CM

## 2023-11-03 DIAGNOSIS — M43.16 SPONDYLOLISTHESIS OF LUMBAR REGION: ICD-10-CM

## 2023-11-03 DIAGNOSIS — M48.062 LUMBAR STENOSIS WITH NEUROGENIC CLAUDICATION: ICD-10-CM

## 2023-11-03 PROBLEM — Z79.4 ENCOUNTER FOR LONG-TERM (CURRENT) USE OF INSULIN (HCC): Status: ACTIVE | Noted: 2023-11-03

## 2023-11-03 PROCEDURE — 99244 OFF/OP CNSLTJ NEW/EST MOD 40: CPT | Performed by: ANESTHESIOLOGY

## 2023-11-03 NOTE — LETTER
November 4, 2023     Natalie Waterloo, [address] [place] [address]    Patient: Soumya Vasquez   YOB: 1945   Date of Visit: 11/3/2023       Dear Dr. Piero Oneal: Thank you for referring Tena Pool to me for evaluation. Below are my notes for this consultation. If you have questions, please do not hesitate to call me. I look forward to following your patient along with you.          Sincerely,        Joel [place], DO        CC: No Recipients

## 2023-11-03 NOTE — PROGRESS NOTES
Assessment  1. Lumbar radiculopathy    2. Spondylolisthesis of lumbar region    3. Lumbar stenosis with neurogenic claudication        Plan  72-year-old female referred by neurosurgery, presenting for initial consultation regarding a 7-week history of lumbosacral back pain that radiates into the anterior aspect of the left thigh to the knee with associated numbness. The patient was lifting some boxes of clothing which led to the onset of her symptoms. MRI of the lumbar spine demonstrates multilevel spondylosis with anterolisthesis of L3 and L4. Moderate to severe central with mild right foraminal stenosis at this level. Mild central and foraminal stenosis at L1-2 and L2-3. Mild right foraminal stenosis at L4-5. Patient has transitional anatomy with a sacralized L5 vertebral body. The patient has tried physical therapy, however this exacerbated her symptoms and she discontinued it. She has been taking gabapentin 300 mg nightly which does provide some relief. She is unable to tolerate daytime doses secondary to sedation. Ibuprofen does provide some relief. The patient's symptoms are consistent with L4 radiculopathy stemming from central stenosis at L3-4.    1.  I will schedule the patient for left L3 and L4 TFESI  2. The patient will continue with gabapentin as prescribed  3. Patient will continue with her home exercise program  4. I will follow-up with the patient in 6 weeks    Complete risks and benefits including bleeding, infection, tissue reaction, nerve injury and allergic reaction were discussed. The approach was demonstrated using models and literature was provided. Verbal and written consent was obtained. My impressions and treatment recommendations were discussed in detail with the patient who verbalized understanding and had no further questions. Discharge instructions were provided. I personally saw and examined the patient and I agree with the above discussed plan of care.     No orders of the defined types were placed in this encounter. No orders of the defined types were placed in this encounter. History of Present Illness    Maximino Mcmanus is a 70 y.o. female referred by neurosurgery, presenting for initial consultation regarding a 7-week history of lumbosacral back pain that radiates into the anterior aspect of the left thigh to the knee with associated numbness. The patient was lifting some boxes of clothing which led to the onset of her symptoms. She denies any right lower extremity symptoms, bladder or bowel incontinence, or saddle anesthesia. MRI of the lumbar spine demonstrates multilevel spondylosis with anterolisthesis of L3 and L4. Moderate to severe central with mild right foraminal stenosis at this level. Mild central and foraminal stenosis at L1-2 and L2-3. Mild right foraminal stenosis at L4-5. Patient has transitional anatomy with a sacralized L5 vertebral body. The patient has tried physical therapy, however this exacerbated her symptoms and she discontinued it. She has been taking gabapentin 300 mg nightly which does provide some relief. She is unable to tolerate daytime doses secondary to sedation. Ibuprofen does provide some relief. The patient rates her pain 10 out of 10 and the pain is nearly constant. The pain does not follow any pattern throughout the day. Pain is increased with burning, numbness, and throbbing. The pain is alleviated with lying down and sitting. The pain is increased with standing, bending, and walking. She does find some relief with heat and ice application. Other than as stated above, the patient denies any interval changes in medications, medical condition, mental condition, symptoms, or allergies since the last office visit. I have personally reviewed and/or updated the patient's past medical history, past surgical history, family history, social history, current medications, allergies, and vital signs today.      Review of Systems   Constitutional:  Negative for fever and unexpected weight change. HENT:  Negative for trouble swallowing. Eyes:  Negative for visual disturbance. Respiratory:  Negative for shortness of breath and wheezing. Cardiovascular:  Negative for chest pain and palpitations. Gastrointestinal:  Negative for constipation, diarrhea, nausea and vomiting. Endocrine: Negative for cold intolerance, heat intolerance and polydipsia. Genitourinary:  Negative for difficulty urinating and frequency. Musculoskeletal:  Negative for arthralgias, gait problem, joint swelling and myalgias. Skin:  Negative for rash. Neurological:  Negative for dizziness, seizures, syncope, weakness and headaches. Hematological:  Does not bruise/bleed easily. Psychiatric/Behavioral:  Negative for dysphoric mood. All other systems reviewed and are negative. Patient Active Problem List   Diagnosis    Restless leg syndrome    Spondylolisthesis of lumbar region    Numbness in feet    Lumbar radiculopathy    Lumbar stenosis with neurogenic claudication       No past medical history on file. Past Surgical History:   Procedure Laterality Date    CATARACT EXTRACTION      GALLBLADDER SURGERY         No family history on file.     Social History     Occupational History    Not on file   Tobacco Use    Smoking status: Every Day     Packs/day: 1.00     Years: 50.00     Total pack years: 50.00     Types: Cigarettes    Smokeless tobacco: Never   Substance and Sexual Activity    Alcohol use: No    Drug use: No    Sexual activity: Not on file       Current Outpatient Medications on File Prior to Visit   Medication Sig    Ascorbic Acid (VITAMIN C) 500 MG CAPS Take by mouth Daily    aspirin 81 MG tablet Take 81 mg by mouth daily    brompheniramine-pseudoephedrine-DM 30-2-10 MG/5ML syrup Take 5 mL by mouth 3 (three) times a day as needed for allergies    Cholecalciferol (VITAMIN D-3) 5000 units TABS Take by mouth Daily    CINNAMON PO Take by mouth daily (Patient not taking: Reported on 10/2/2023)    fluticasone (FLONASE) 50 mcg/act nasal spray 2 sprays into each nostril daily    fluticasone-salmeterol (Advair) 250-50 mcg/dose inhaler Inhale    liraglutide (SAXENDA) injection 3 mg Daily    loratadine-pseudoephedrine (CLARITIN-D 24-HOUR)  mg per 24 hr tablet Take 1 tablet by mouth daily    Magnesium 200 MG TABS Take by mouth daily    Menaquinone-7 (VITAMIN K2) 100 MCG CAPS Take by mouth daily    methylPREDNISolone 4 MG tablet therapy pack Use as directed on package    Omega-3 1000 MG CAPS Take by mouth daily    Omega-3 Fatty Acids (Ultra Omega 3) 1000 MG CAPS daily    ondansetron (ZOFRAN) 8 mg tablet Take 8 mg by mouth every 8 (eight) hours    Probiotic Product (PROBIOTIC-10 PO) Daily    raloxifene (EVISTA) 60 mg tablet Take 60 mg by mouth daily    vitamin B-12 (VITAMIN B-12) 500 mcg tablet Take 500 mcg by mouth daily    Vitamin E 400 units TABS Take 400 mg by mouth Daily     No current facility-administered medications on file prior to visit. Allergies   Allergen Reactions    Amoxicillin-Pot Clavulanate Diarrhea    Molds & Smuts        Physical Exam    /71   Pulse (!) 111   Ht 5' 2" (1.575 m)   Wt 57.6 kg (127 lb)   BMI 23.23 kg/m²     Constitutional: normal, well developed, well nourished, alert, in no distress and non-toxic and no overt pain behavior. Eyes: anicteric  HEENT: grossly intact  Neck: supple, symmetric, trachea midline and no masses   Pulmonary:even and unlabored  Cardiovascular:No edema or pitting edema present  Skin:Normal without rashes or lesions and well hydrated  Psychiatric:Mood and affect appropriate  Neurologic:Cranial Nerves II-XII grossly intact  Musculoskeletal:antalgic gait. Bilateral lumbar paraspinals nontender to palpation. Bilateral SI joints nontender to palpation. Bilateral patellar and Achilles reflexes were 2 out of 4 and symmetrical.  No clonus was noted bilaterally.   Bilateral lower extremity strength 5 out of 5 in all muscle groups. Sensation to light touch in the L4 distribution of the left leg. Negative straight leg raise bilaterally. Negative Tramaine's test bilaterally. Imaging    Study Result    Narrative & Impression   MRI LUMBAR SPINE WITHOUT CONTRAST     INDICATION: M43.16: Spondylolisthesis, lumbar region  M54.16: Radiculopathy, lumbar region  M48.062: Spinal stenosis, lumbar region with neurogenic claudication. COMPARISON: Lumbar spine radiograph 10/2/2023. MRI outside lumbar spine 6/4/2018     TECHNIQUE:  Multiplanar, multisequence imaging of the lumbar spine was performed. .        IMAGE QUALITY:  Diagnostic     FINDINGS:     VERTEBRAL BODIES:  There is a transitional lumbosacral junction - sacralized L5 vertebral body with bilateral assimilation joints. Mild dextroscoliosis of lumbar spine. Grade 1 retrolisthesis T12-L1 and L1-L2, unchanged. Grade 1 anterolisthesis L3-L4,   unchanged. No compression fracture. Mixed type II/III Modic endplate change N03-S57. Type II Modic endplate change W1-T3. Otherwise, normal bone marrow signal.     SACRUM:  Normal signal within the sacrum. No evidence of insufficiency or stress fracture. DISTAL CORD AND CONUS:  Normal size and signal within the distal cord and conus. PARASPINAL SOFT TISSUES: Mild intramuscular edema in paraspinal lumbar musculature (right worse than left). LOWER THORACIC DISC SPACES: Moderate noncompressive lower thoracic degenerative change. LUMBAR DISC SPACES: Multilevel osteophytes, disc height loss, and facet arthropathy. L1-L2: Diffuse disc bulge. Facet arthropathy, ligamentum flavum thickening. Mild canal stenosis, unchanged. Mild left foraminal narrowing, unchanged. L2-L3: Diffuse disc bulge, small right foraminal/extraforaminal disc protrusion. Facet arthropathy, ligamentum flavum thickening. Mild canal stenosis, unchanged.  Mild bilateral foraminal narrowing (right worse than left), unchanged. L3-L4: Diffuse disc bulge, mild uncoverage of posterior disc, narrowed bilateral subarticular zones, and contact of right L3 exiting nerve. Hypertrophic facet arthropathy, ligamentum flavum thickening. Moderate-to-severe canal stenosis, worsened. Mild   right foraminal narrowing, unchanged. L4-L5: Mild diffuse disc bulge, posterior marginal osteophytes. Facet arthropathy. Facet arthropathy. No significant canal stenosis, unchanged. Mild right foraminal narrowing, unchanged. L5-S1: Sacralized L5 vertebral body with bilateral assimilation joints. No significant canal stenosis or foraminal narrowing, unchanged. OTHER FINDINGS:  None. IMPRESSION:     Similar to slightly worsened multilevel degenerative changes of lumbar spine with transitional lumbosacral anatomy (sacralized L5 vertebral body with bilateral assimilation joints), varying degrees of canal stenosis (moderate-to-severe L3-L4) and   foraminal narrowing (multilevel mild) as detailed above. Mild intramuscular edema in paraspinal lumbar musculature (right worse than left), likely due to muscular strain injury. The study was marked in EPIC for significant notification. Workstation performed: [id number]     Study Result    Narrative & Impression   LUMBAR SPINE     INDICATION:   M43.16: Spondylolisthesis, lumbar region. M54.16: Radiculopathy, lumbar region. P35.212: Spinal stenosis, lumbar region with neurogenic claudication. COMPARISON: Outside lumbar spine MRI from 6/4/2018 performed at [address] [place] [address]. VIEWS:  XR LUMBAR SP/BENDING ONLY MIN 2-3 V  Images: 4     FINDINGS:     There is grade 1 anterolisthesis of L2 on L1 by 4 mm. There is grade 1 retrolisthesis of L4 on L3 by 7 mm. This is similar to the prior MRI from 2018. There is no evidence of instability with flexion or extension. Normal mild lumbar lordosis is otherwise maintained. No traumatic subluxation.  5 non-rib-bearing lumbar vertebrae. No vertebral body fractures. Multilevel degenerative changes. No definite lytic or sclerotic osseous lesions. Surgical clips project over the abdomen anterior to the L2 vertebral body. IMPRESSION:     No acute lumbar vertebral body fracture or traumatic subluxation. Chronic grade 1 anterolisthesis of L2 on L1 by 4 mm and grade 1 retrolisthesis of L4 on L3 by 7 mm, similar to prior outside MRI from 2018. No evidence of instability with flexion or extension. Multilevel degenerative changes.            Workstation performed: [id number]

## 2023-11-07 ENCOUNTER — HOSPITAL ENCOUNTER (OUTPATIENT)
Dept: RADIOLOGY | Facility: CLINIC | Age: 78
Discharge: HOME | End: 2023-11-07
Payer: COMMERCIAL

## 2023-11-07 VITALS
HEART RATE: 92 BPM | TEMPERATURE: 98 F | DIASTOLIC BLOOD PRESSURE: 58 MMHG | SYSTOLIC BLOOD PRESSURE: 124 MMHG | OXYGEN SATURATION: 99 % | RESPIRATION RATE: 18 BRPM

## 2023-11-07 DIAGNOSIS — M54.16 LUMBAR RADICULOPATHY: ICD-10-CM

## 2023-11-07 PROCEDURE — 64483 NJX AA&/STRD TFRM EPI L/S 1: CPT | Performed by: ANESTHESIOLOGY

## 2023-11-07 PROCEDURE — 64484 NJX AA&/STRD TFRM EPI L/S EA: CPT | Performed by: ANESTHESIOLOGY

## 2023-11-07 RX ORDER — PAPAVERINE HCL 150 MG
15 CAPSULE, EXTENDED RELEASE ORAL ONCE
Status: COMPLETED | OUTPATIENT
Start: 2023-11-07 | End: 2023-11-07

## 2023-11-07 RX ADMIN — IOHEXOL 2 ML: 300 INJECTION, SOLUTION INTRAVENOUS at 10:11

## 2023-11-07 RX ADMIN — LIDOCAINE HYDROCHLORIDE 2 ML: 20 INJECTION, SOLUTION EPIDURAL; INFILTRATION; INTRACAUDAL; PERINEURAL at 10:12

## 2023-11-07 RX ADMIN — DEXAMETHASONE SODIUM PHOSPHATE 15 MG: 10 INJECTION, SOLUTION INTRAMUSCULAR; INTRAVENOUS at 10:12

## 2023-11-07 NOTE — H&P
History of Present Illness: The patient is a 70 y.o. female who presents with complaints of low back and leg pain. History reviewed. No pertinent past medical history.     Past Surgical History:   Procedure Laterality Date    CATARACT EXTRACTION      GALLBLADDER SURGERY           Current Outpatient Medications:     Ascorbic Acid (VITAMIN C) 500 MG CAPS, Take by mouth Daily, Disp: , Rfl:     aspirin 81 MG tablet, Take 81 mg by mouth daily, Disp: , Rfl:     brompheniramine-pseudoephedrine-DM 30-2-10 MG/5ML syrup, Take 5 mL by mouth 3 (three) times a day as needed for allergies, Disp: 120 mL, Rfl: 0    Cholecalciferol (VITAMIN D-3) 5000 units TABS, Take by mouth Daily, Disp: , Rfl:     CINNAMON PO, Take by mouth daily (Patient not taking: Reported on 10/2/2023), Disp: , Rfl:     fluticasone (FLONASE) 50 mcg/act nasal spray, 2 sprays into each nostril daily, Disp: , Rfl:     fluticasone-salmeterol (Advair) 250-50 mcg/dose inhaler, Inhale, Disp: , Rfl:     liraglutide (SAXENDA) injection, 3 mg Daily, Disp: , Rfl:     loratadine-pseudoephedrine (CLARITIN-D 24-HOUR)  mg per 24 hr tablet, Take 1 tablet by mouth daily, Disp: , Rfl:     Magnesium 200 MG TABS, Take by mouth daily, Disp: , Rfl:     Menaquinone-7 (VITAMIN K2) 100 MCG CAPS, Take by mouth daily, Disp: , Rfl:     methylPREDNISolone 4 MG tablet therapy pack, Use as directed on package, Disp: 1 each, Rfl: 0    Omega-3 1000 MG CAPS, Take by mouth daily, Disp: , Rfl:     Omega-3 Fatty Acids (Ultra Omega 3) 1000 MG CAPS, daily, Disp: , Rfl:     ondansetron (ZOFRAN) 8 mg tablet, Take 8 mg by mouth every 8 (eight) hours, Disp: , Rfl:     Probiotic Product (PROBIOTIC-10 PO), Daily, Disp: , Rfl:     raloxifene (EVISTA) 60 mg tablet, Take 60 mg by mouth daily, Disp: , Rfl:     vitamin B-12 (VITAMIN B-12) 500 mcg tablet, Take 500 mcg by mouth daily, Disp: , Rfl:     Vitamin E 400 units TABS, Take 400 mg by mouth Daily, Disp: , Rfl:     Allergies   Allergen Reactions Amoxicillin-Pot Clavulanate Diarrhea    Molds & Smuts        Physical Exam:   Vitals:    11/07/23 0944   BP: 125/63   Pulse: 63   Resp: 18   Temp: 98 °F (36.7 °C)   SpO2: 97%     General: Awake, Alert, Oriented x 3, Mood and affect appropriate  Respiratory: Respirations even and unlabored  Cardiovascular: Peripheral pulses intact; no edema  Musculoskeletal Exam: Left lumbar paraspinals tender to palpation    ASA Score: 2         Assessment:   1.  Lumbar radiculopathy        Plan: Left L3 and L4 TFESI

## 2023-11-07 NOTE — DISCHARGE INSTRUCTIONS
Epidural Steroid Injection   WHAT YOU NEED TO KNOW:   An epidural steroid injection (BLANCA) is a procedure to inject steroid medicine into the epidural space. The epidural space is between your spinal cord and vertebrae. Steroids reduce inflammation and fluid buildup in your spine that may be causing pain. You may be given pain medicine along with the steroids. ACTIVITY  Do not drive or operate machinery today. No strenuous activity today - bending, lifting, etc.  You may resume normal activites starting tomorrow - start slowly and as tolerated. You may shower today, but no tub baths or hot tubs. You may have numbness for several hours from the local anesthetic. Please use caution and common sense, especially with weight-bearing activities. CARE OF THE INJECTION SITE  If you have soreness or pain, apply ice to the area today (20 minutes on/20 minutes off). Starting tomorrow, you may use warm, moist heat or ice if needed. You may have an increase or change in your discomfort for 36-48 hours after your treatment. Apply ice and continue with any pain medication you have been prescribed. Notify the [facility] if you have any of the following: redness, drainage, swelling, headache, stiff neck or fever above 100°F.    SPECIAL INSTRUCTIONS  Our office will contact you in approximately 7 days for a progress report. MEDICATIONS  Continue to take all routine medications. Our office may have instructed you to hold some medications. As no general anesthesia was used in today's procedure, you should not experience any side effects related to anesthesia. If you are diabetic, the steroids used in today's injection may temporarily increase your blood sugar levels after the first few days after your injection. Please keep a close eye on your sugars and alert the doctor who manages your diabetes if your sugars are significantly high from your baseline or you are symptomatic.      If you have a problem specifically related to your procedure, please call our office at [phone]. Problems not related to your procedure should be directed to your primary care physician.

## 2023-11-08 ENCOUNTER — TELEPHONE (OUTPATIENT)
Age: 78
End: 2023-11-08

## 2023-11-08 NOTE — TELEPHONE ENCOUNTER
S/W pt, advised pt to take tylenol for HA. Nurse did advise pt to CB if HA intensifies and becomes intolerable despite interventions such as tylenol/ibuprofen. Pt verbalized understanding and appreciative of call.

## 2023-11-08 NOTE — TELEPHONE ENCOUNTER
Caller: Saundra Mora, pt    Doctor: Zain Brady    Reason for call: pt called stating she had her procedure yesterday and she is feeling really good other than a dull HA.   She feels confident that she could take tylenol to get rid of it but she would like nursing advise before hand    Call back#: [phone]

## 2023-11-14 ENCOUNTER — TELEPHONE (OUTPATIENT)
Dept: PAIN MEDICINE | Facility: CLINIC | Age: 78
End: 2023-11-14

## 2023-11-14 NOTE — TELEPHONE ENCOUNTER
Patient Reports      50   %     improvement post injection    Pain Level   3  /10  Patient fell on left side. Will wait for next call for more improvement.

## 2023-11-21 NOTE — TELEPHONE ENCOUNTER
Patient Reports      0   %     improvement post injection 2week    Pain Level    3-8 /10   Patient fell on hip    Patient would like a call back to see when she can get the injection again.

## 2023-11-21 NOTE — TELEPHONE ENCOUNTER
Mom and baby are going home today. Continue to offer the breast without restriction. Mom's milk should be fully in over the next few days. Reviewed engorgement precautions. Hand Expression has been demoed and written hand-out reviewed. As milk comes in baby will be more alert at the breast and swallows will be more obvious. Breasts may feel softer once baby has finished nursing. Baby should be back to birth weight by 3weeks of age. And then gain on average 1 oz per day for the next 2-3 months. Reviewed babies should be exclusively breastfeeding for the first 6 months and that breastfeeding should continue after introduction of appropriate complimentary foods after 6 months. Initial output should be at least 1 wet and 1 bowel movement for each day old baby is. By day 5-7 once milk is fully in baby will consistently have 6 or more soaking wet diapers and about 4 bowel movement. Some babies have a bowel movement with every feeding and some have 1-3 large bowel movements each day. Inadequate output may indicate inadequate feedings and should be reported to your Pediatrician. Bowel habits may change as baby gets older. Encouraged follow-up at Pediatrician in 1-2 days, no later than 1 week of age. Call [facility] for any questions as needed or to set up an OP visit. OP phone calls are returned within 24 hours. Community Breastfeeding Resource List given. Please schedule follow-up office visit for reevaluation

## 2023-11-29 NOTE — PROGRESS NOTES
Assessment:  1. Chronic pain syndrome    2. Lumbar radiculopathy    3. Lumbar stenosis with neurogenic claudication    4. Spondylolisthesis of lumbar region        Plan:  Patient will be scheduled for an L3-4 LESI to address the inflammatory component of the patient's pain. Complete risks and benefits including bleeding, infection, tissue reaction, nerve injury and allergic reaction were discussed. The patient was agreeable and verbalized an understanding  I will increase gabapentin to 300 mg 3 times a day for pain complaints. Patient was given titration instructions during today's office visit. I advised the patient that if they experience any side effects or issues with the changes in their medication regiment, they should give our office a call to discuss. I also advised the patient not to drive or operate machinery until they see how the changes in the medication regimen affects them. The patient was agreeable and verbalized an understanding. Continue to follow with neurosurgery as scheduled  Continue with home exercise program  Follow-up after procedure or sooner if needed    History of Present Illness: The patient is a 70 y.o. female last seen on 11/03/2023  who presents for a follow up office visit in regards to chronic lumbosacral back pain that radiates into the anterior aspect of the left lower extremity to the knee with associated paresthesias. She denies right-sided symptoms, bowel or bladder incontinence or saddle anesthesia. Patient is status post left L3 and L4 TFESI November 7, 2023. Patient states she was doing well for the first 3 days until she suffered a ground-level fall and her pain then quickly returned to baseline. She continues on gabapentin 300 mg nightly with mild relief. Ibuprofen provides some relief. The patient rates her pain an 8 out of 10 on the numeric pain rating scale.   She constantly has pain which is described as burning, throbbing and numbness    I have personally reviewed and/or updated the patient's past medical history, past surgical history, family history, social history, current medications, allergies, and vital signs today. Review of Systems:    Review of Systems   Respiratory:  Negative for shortness of breath. Cardiovascular:  Negative for chest pain. Gastrointestinal:  Negative for constipation, diarrhea, nausea and vomiting. Musculoskeletal:  Negative for arthralgias, gait problem, joint swelling and myalgias. Skin:  Negative for rash. Neurological:  Negative for dizziness, seizures and weakness. All other systems reviewed and are negative. History reviewed. No pertinent past medical history. Past Surgical History:   Procedure Laterality Date    CATARACT EXTRACTION      GALLBLADDER SURGERY         History reviewed. No pertinent family history.     Social History     Occupational History    Not on file   Tobacco Use    Smoking status: Every Day     Packs/day: 1.00     Years: 50.00     Total pack years: 50.00     Types: Cigarettes    Smokeless tobacco: Never   Substance and Sexual Activity    Alcohol use: No    Drug use: No    Sexual activity: Not on file         Current Outpatient Medications:     Ascorbic Acid (VITAMIN C) 500 MG CAPS, Take by mouth Daily, Disp: , Rfl:     aspirin 81 MG tablet, Take 81 mg by mouth daily, Disp: , Rfl:     Cholecalciferol (VITAMIN D-3) 5000 units TABS, Take by mouth Daily, Disp: , Rfl:     fluticasone (FLONASE) 50 mcg/act nasal spray, 2 sprays into each nostril daily, Disp: , Rfl:     fluticasone-salmeterol (Advair) 250-50 mcg/dose inhaler, Inhale, Disp: , Rfl:     gabapentin (NEURONTIN) 300 mg capsule, Take 1 PO BID x 5 days, then 1 PO TID, Disp: 90 capsule, Rfl: 1    loratadine-pseudoephedrine (CLARITIN-D 24-HOUR)  mg per 24 hr tablet, Take 1 tablet by mouth daily, Disp: , Rfl:     Magnesium 200 MG TABS, Take by mouth daily, Disp: , Rfl:     Menaquinone-7 (VITAMIN K2) 100 MCG CAPS, Take by mouth daily, Disp: , Rfl:     Omega-3 Fatty Acids (Ultra Omega 3) 1000 MG CAPS, daily, Disp: , Rfl:     Probiotic Product (PROBIOTIC-10 PO), Daily, Disp: , Rfl:     raloxifene (EVISTA) 60 mg tablet, Take 60 mg by mouth daily, Disp: , Rfl:     vitamin B-12 (VITAMIN B-12) 500 mcg tablet, Take 500 mcg by mouth daily, Disp: , Rfl:     Vitamin E 400 units TABS, Take 400 mg by mouth Daily, Disp: , Rfl:     brompheniramine-pseudoephedrine-DM 30-2-10 MG/5ML syrup, Take 5 mL by mouth 3 (three) times a day as needed for allergies, Disp: 120 mL, Rfl: 0    CINNAMON PO, Take by mouth daily (Patient not taking: Reported on 10/2/2023), Disp: , Rfl:     liraglutide (SAXENDA) injection, 3 mg Daily, Disp: , Rfl:     methylPREDNISolone 4 MG tablet therapy pack, Use as directed on package, Disp: 1 each, Rfl: 0    Omega-3 1000 MG CAPS, Take by mouth daily, Disp: , Rfl:     ondansetron (ZOFRAN) 8 mg tablet, Take 8 mg by mouth every 8 (eight) hours, Disp: , Rfl:     Allergies   Allergen Reactions    Amoxicillin-Pot Clavulanate Diarrhea    Molds & Smuts        Physical Exam:    /71   Pulse 93   Ht 5' 2" (1.575 m)   Wt 57.6 kg (127 lb)   BMI 23.23 kg/m²     Constitutional:normal, well developed, well nourished, alert, in no distress and non-toxic and no overt pain behavior.   Eyes:anicteric  HEENT:grossly intact  Neck:supple, symmetric, trachea midline and no masses   Pulmonary:even and unlabored  Cardiovascular:No edema or pitting edema present  Skin:Normal without rashes or lesions and well hydrated  Psychiatric:Mood and affect appropriate  Neurologic:Cranial Nerves II-XII grossly intact  Musculoskeletal:antalgic gait but steady without assistive devices      Imaging  FL spine and pain procedure    (Results Pending)       Narrative & Impression  MRI LUMBAR SPINE WITHOUT CONTRAST     INDICATION: M43.16: Spondylolisthesis, lumbar region  M54.16: Radiculopathy, lumbar region  M48.062: Spinal stenosis, lumbar region with neurogenic claudication. COMPARISON: Lumbar spine radiograph 10/2/2023. MRI outside lumbar spine 6/4/2018     TECHNIQUE:  Multiplanar, multisequence imaging of the lumbar spine was performed. .        IMAGE QUALITY:  Diagnostic     FINDINGS:     VERTEBRAL BODIES:  There is a transitional lumbosacral junction - sacralized L5 vertebral body with bilateral assimilation joints. Mild dextroscoliosis of lumbar spine. Grade 1 retrolisthesis T12-L1 and L1-L2, unchanged. Grade 1 anterolisthesis L3-L4,   unchanged. No compression fracture. Mixed type II/III Modic endplate change K81-R97. Type II Modic endplate change X4-J3. Otherwise, normal bone marrow signal.     SACRUM:  Normal signal within the sacrum. No evidence of insufficiency or stress fracture. DISTAL CORD AND CONUS:  Normal size and signal within the distal cord and conus. PARASPINAL SOFT TISSUES: Mild intramuscular edema in paraspinal lumbar musculature (right worse than left). LOWER THORACIC DISC SPACES: Moderate noncompressive lower thoracic degenerative change. LUMBAR DISC SPACES: Multilevel osteophytes, disc height loss, and facet arthropathy. L1-L2: Diffuse disc bulge. Facet arthropathy, ligamentum flavum thickening. Mild canal stenosis, unchanged. Mild left foraminal narrowing, unchanged. L2-L3: Diffuse disc bulge, small right foraminal/extraforaminal disc protrusion. Facet arthropathy, ligamentum flavum thickening. Mild canal stenosis, unchanged. Mild bilateral foraminal narrowing (right worse than left), unchanged. L3-L4: Diffuse disc bulge, mild uncoverage of posterior disc, narrowed bilateral subarticular zones, and contact of right L3 exiting nerve. Hypertrophic facet arthropathy, ligamentum flavum thickening. Moderate-to-severe canal stenosis, worsened. Mild   right foraminal narrowing, unchanged. L4-L5: Mild diffuse disc bulge, posterior marginal osteophytes. Facet arthropathy. Facet arthropathy.  No significant canal stenosis, unchanged. Mild right foraminal narrowing, unchanged. L5-S1: Sacralized L5 vertebral body with bilateral assimilation joints. No significant canal stenosis or foraminal narrowing, unchanged. OTHER FINDINGS:  None. IMPRESSION:     Similar to slightly worsened multilevel degenerative changes of lumbar spine with transitional lumbosacral anatomy (sacralized L5 vertebral body with bilateral assimilation joints), varying degrees of canal stenosis (moderate-to-severe L3-L4) and   foraminal narrowing (multilevel mild) as detailed above. Mild intramuscular edema in paraspinal lumbar musculature (right worse than left), likely due to muscular strain injury. The study was marked in EPIC for significant notification.     Orders Placed This Encounter   Procedures    FL spine and pain procedure

## 2023-12-01 ENCOUNTER — OFFICE VISIT (OUTPATIENT)
Dept: PAIN MEDICINE | Facility: CLINIC | Age: 78
End: 2023-12-01
Payer: COMMERCIAL

## 2023-12-01 ENCOUNTER — OFFICE VISIT (OUTPATIENT)
Dept: NEUROSURGERY | Facility: CLINIC | Age: 78
End: 2023-12-01
Payer: COMMERCIAL

## 2023-12-01 VITALS
TEMPERATURE: 97.4 F | RESPIRATION RATE: 16 BRPM | SYSTOLIC BLOOD PRESSURE: 110 MMHG | HEART RATE: 102 BPM | HEIGHT: 62 IN | DIASTOLIC BLOOD PRESSURE: 74 MMHG | OXYGEN SATURATION: 97 % | BODY MASS INDEX: 23 KG/M2 | WEIGHT: 125 LBS

## 2023-12-01 VITALS
HEIGHT: 62 IN | SYSTOLIC BLOOD PRESSURE: 107 MMHG | HEART RATE: 93 BPM | WEIGHT: 127 LBS | BODY MASS INDEX: 23.37 KG/M2 | DIASTOLIC BLOOD PRESSURE: 71 MMHG

## 2023-12-01 DIAGNOSIS — G89.4 CHRONIC PAIN SYNDROME: Primary | ICD-10-CM

## 2023-12-01 DIAGNOSIS — M48.062 LUMBAR STENOSIS WITH NEUROGENIC CLAUDICATION: ICD-10-CM

## 2023-12-01 DIAGNOSIS — M54.16 LUMBAR RADICULOPATHY: ICD-10-CM

## 2023-12-01 DIAGNOSIS — M43.16 SPONDYLOLISTHESIS OF LUMBAR REGION: ICD-10-CM

## 2023-12-01 DIAGNOSIS — M43.16 SPONDYLOLISTHESIS OF LUMBAR REGION: Primary | ICD-10-CM

## 2023-12-01 PROCEDURE — 99215 OFFICE O/P EST HI 40 MIN: CPT | Performed by: NURSE PRACTITIONER

## 2023-12-01 PROCEDURE — 99214 OFFICE O/P EST MOD 30 MIN: CPT | Performed by: NURSE PRACTITIONER

## 2023-12-01 RX ORDER — GABAPENTIN 300 MG/1
CAPSULE ORAL
Qty: 90 CAPSULE | Refills: 1 | Status: SHIPPED | OUTPATIENT
Start: 2023-12-01

## 2023-12-01 NOTE — PROGRESS NOTES
Assessment/Plan:    Spondylolisthesis of lumbar region  As addressed in HPI  Reports that she received an injection after her visit that helped about 50% and pain level decreased to about 2/10. Since that time she fell 2 days after the treatment and her pain has returned to baseline severity of 8/10. Today she reports the pain severity of 8/10. During the visit she reports left hip pain, upper leg pain to the knee, when walking she has numbness in the upper left leg and sometimes associated with tingling. She is a repeat interventional treatment schedule pending date for left BLANCA L3-L4. Patient denies red flag signs  Admits she does walk leaning forward and has noticed that this relieves her pain  also acknowledged that he observed her doing the same. Patient denies leaning on a shopping cart when in a grocery store etc.    Imaging  10/10/2023MRI lumbar spine Similar to slightly worsened multilevel degenerative changes of lumbar spine with transitional lumbosacral anatomy (sacralized L5 vertebral body with bilateral assimilation joints), varying degrees of canal stenosis (moderate-to-severe L3-L4) and foraminal narrowing (multilevel mild) as detailed above. Mild intramuscular edema in paraspinal lumbar musculature (right worse than left), likely due to muscular strain injury. The study was marked in EPIC for significant notification. 10/2/2023 x-ray lumbar spine---Chronic grade 1 anterolisthesis of L2 on L1 by 4 mm and grade 1 retrolisthesis of L4 on L3 by 7 mm, similar to prior outside MRI from 2018. No evidence of instability with flexion or extension. Multilevel degenerative changes.       Plan  Reviewed MRI lumbar spine extensively-demonstrates multilevel surgical pathology  Reviewed x-ray lumbar spine  Patient verbalized she is not interested in meeting with a surgeon as is not considering moving forward with surgical intervention at this time  Patient verbalized her desire to continue with multimodal conservative treatment HEP, and interventional treatments through pain management. Explained the expected therapeutic effect of both physical therapy and pain management/interventional treatments. Explained the benefit of HEP for muscle strengthening in the setting of  Mild dextroscoliosis of lumbar spine seen on MRI lumbar  Reviewed red flag signs advised if she develops to go immediately to her closest emergency room  Advised patient if she has additional questions or concerns to call/contact the neurosurgery office  Discussed osteopenia/osteoporosis and the need to discuss with her PCP moving forward with optimal treatment. Recommend patient follow-up with her PCP for an updated DEXA scan. Provided teaching information in AVS.  Recommend she continue with medicinal treatment gabapentin, and scheduled acetaminophen dosing. Advised against routine scheduled Advil dosing as the effect that NSAIDs have on bone growth and integrity as she already has osteopenia on a DEXA scan from 2021. Nicotine is a risk factor for osteoporosis. Both patient and  verbalized appreciation for visit and information provided. Subjective: Return to office for follow-up visit 2 10/2/2023 for imaging review and reassessment, she is scheduled as a joint appointment with     Patient ID: Beatrice Ralph is a 70 y.o. female nicotine dependence, osteopenia/senile osteoporosis, chronic cough,  COPD    HPI   Initial neurosurgery consult visit 10/2/2023 for lumbar spine patient reported left leg symptoms in the setting of lumbar spinal stenosis with intermittent back pain, and anterior lateral left thigh pain radiating around from the buttocks. This pain is associated with numbness in the same distribution. .  She reported developing new onset of left leg pain approximately a week and a half prior to visit.   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 An MRI of the lumbar spine was ordered by her PCP in [place]. Imaging  MRI lumbar spine wo 10/2/23: Diffuse lumbar spondylosis. Multilevel spinal canal stenosis worst at L4-5 secondary to small disc bulge, moderate facet hypertrophy. Moderate central canal stenosis with moderate right and mild left neural foraminal stenosis. Multimodal treatments   PM-Dr. Jena Berry- 11/7/2023 left L3 and L4 transforaminal epidural steroid injection , she reported receiving 50% efficacy. PT- attended several sessions for which she says symptoms worsened. .She received a home exercise program for which she says is not performing regularly because she is in too much pain. Medicinal -acetaminophen and Motrin but the pain persist .  She also treats with gabapentin    At the initial consult visit additional imaging was ordered without contrast MRI to evaluate for progression of degenerative changes. X-ray lumbar spine flexion and extension was ordered to evaluate instability as well as fractures in the setting of history of osteoporosis without optimal treatment. A Medrol Dosepak was ordered. She returns today for follow-up visit for imaging review and reassessment. 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 REVIEW OF SYSTEMS  Review of Systems   Gastrointestinal: Negative. Genitourinary: Negative. Musculoskeletal:  Positive for back pain (minimal back pain, pain is more in left hip and upper left leg). Negative for gait problem. PT did not help  Unclear if TFESI would have provided relief as patient feel 2 days post injection   Skin: Negative. Neurological:  Positive for numbness (upper left leg constant when up and walking, sometimes with tingling). Negative for weakness. Psychiatric/Behavioral:  Negative for sleep disturbance.           Meds/Allergies Current Outpatient Medications   Medication Sig Dispense Refill    Ascorbic Acid (VITAMIN C) 500 MG CAPS Take by mouth Daily      aspirin 81 MG tablet Take 81 mg by mouth daily      Cholecalciferol (VITAMIN D-3) 5000 units TABS Take by mouth Daily      fluticasone (FLONASE) 50 mcg/act nasal spray 2 sprays into each nostril daily      fluticasone-salmeterol (Advair) 250-50 mcg/dose inhaler Inhale      gabapentin (NEURONTIN) 300 mg capsule Take 1 PO BID x 5 days, then 1 PO TID (Patient taking differently: Take 300 mg by mouth daily at bedtime Take 1 PO BID x 5 days, then 1 PO TID) 90 capsule 1    loratadine-pseudoephedrine (CLARITIN-D 24-HOUR)  mg per 24 hr tablet Take 1 tablet by mouth daily      Magnesium 200 MG TABS Take by mouth daily      Menaquinone-7 (VITAMIN K2) 100 MCG CAPS Take by mouth daily      Omega-3 Fatty Acids (Ultra Omega 3) 1000 MG CAPS daily      Probiotic Product (PROBIOTIC-10 PO) Daily      raloxifene (EVISTA) 60 mg tablet Take 60 mg by mouth daily      vitamin B-12 (VITAMIN B-12) 500 mcg tablet Take 500 mcg by mouth daily      Vitamin E 400 units TABS Take 400 mg by mouth Daily      brompheniramine-pseudoephedrine-DM 30-2-10 MG/5ML syrup Take 5 mL by mouth 3 (three) times a day as needed for allergies (Patient not taking: Reported on 12/1/2023) 120 mL 0    CINNAMON PO Take by mouth daily (Patient not taking: Reported on 10/2/2023)      liraglutide (SAXENDA) injection 3 mg Daily (Patient not taking: Reported on 12/1/2023)      methylPREDNISolone 4 MG tablet therapy pack Use as directed on package (Patient not taking: Reported on 12/1/2023) 1 each 0    Omega-3 1000 MG CAPS Take by mouth daily      ondansetron (ZOFRAN) 8 mg tablet Take 8 mg by mouth every 8 (eight) hours (Patient not taking: Reported on 12/1/2023)       No current facility-administered medications for this visit.        Allergies   Allergen Reactions    Amoxicillin-Pot Clavulanate Diarrhea    Molds & Smuts        PAST HISTORY    History reviewed. No pertinent past medical history. Past Surgical History:   Procedure Laterality Date    CATARACT EXTRACTION      GALLBLADDER SURGERY         Social History     Tobacco Use    Smoking status: Every Day     Packs/day: 1.00     Years: 50.00     Total pack years: 50.00     Types: Cigarettes    Smokeless tobacco: Never   Substance Use Topics    Alcohol use: No    Drug use: No       History reviewed. No pertinent family history. The following portions of the patient's history were reviewed and updated as appropriate: allergies, current medications, past family history, past medical history, past social history, past surgical history and problem list.      EXAM    Vitals:Blood pressure 110/74, pulse 102, temperature (!) 97.4 °F (36.3 °C), temperature source Temporal, resp. rate 16, height 5' 2" (1.575 m), weight 56.7 kg (125 lb), SpO2 97 %. ,Body mass index is 22.86 kg/m². Physical Exam  Vitals and nursing note reviewed. Exam conducted with a chaperone present (). Constitutional:       General: She is not in acute distress. Appearance: She is normal weight. She is not ill-appearing. Pulmonary:      Effort: Pulmonary effort is normal. No respiratory distress. Comments: Repetitive congested type coughing  Musculoskeletal:         General: Normal range of motion. Right lower leg: No edema. Left lower leg: No edema. Skin:     General: Skin is warm and dry. Neurological:      General: No focal deficit present. Mental Status: She is alert and oriented to person, place, and time. Motor: Motor strength is normal.     Gait: Gait is intact. Deep Tendon Reflexes:      Reflex Scores:       Patellar reflexes are 2+ on the right side. Achilles reflexes are 2+ on the right side and 2+ on the left side.   Psychiatric:         Mood and Affect: Mood normal.         Behavior: Behavior normal.         Neurologic Exam     Mental Status   Oriented to person, place, and time. Level of consciousness: alert    Motor Exam     Strength   Strength 5/5 throughout. Sensory Exam   Light touch normal.     Gait, Coordination, and Reflexes     Gait  Gait: normal    Reflexes   Right patellar: 2+  Right achilles: 2+  Left achilles: 2+  Right ankle clonus: absent  Left ankle clonus: absent      Imaging Studies    10/10/2023 MRI LUMBAR SPINE WITHOUT CONTRAST     INDICATION: M43.16: Spondylolisthesis, lumbar region  M54.16: Radiculopathy, lumbar region  M48.062: Spinal stenosis, lumbar region with neurogenic claudication. COMPARISON: Lumbar spine radiograph 10/2/2023. MRI outside lumbar spine 6/4/2018     TECHNIQUE:  Multiplanar, multisequence imaging of the lumbar spine was performed. .        IMAGE QUALITY:  Diagnostic     FINDINGS:     VERTEBRAL BODIES:  There is a transitional lumbosacral junction - sacralized L5 vertebral body with bilateral assimilation joints. Mild dextroscoliosis of lumbar spine. Grade 1 retrolisthesis T12-L1 and L1-L2, unchanged. Grade 1 anterolisthesis L3-L4,   unchanged. No compression fracture. Mixed type II/III Modic endplate change N88-U83. Type II Modic endplate change V6-I8. Otherwise, normal bone marrow signal.     SACRUM:  Normal signal within the sacrum. No evidence of insufficiency or stress fracture. DISTAL CORD AND CONUS:  Normal size and signal within the distal cord and conus. PARASPINAL SOFT TISSUES: Mild intramuscular edema in paraspinal lumbar musculature (right worse than left). LOWER THORACIC DISC SPACES: Moderate noncompressive lower thoracic degenerative change. LUMBAR DISC SPACES: Multilevel osteophytes, disc height loss, and facet arthropathy. L1-L2: Diffuse disc bulge. Facet arthropathy, ligamentum flavum thickening. Mild canal stenosis, unchanged. Mild left foraminal narrowing, unchanged. L2-L3: Diffuse disc bulge, small right foraminal/extraforaminal disc protrusion.  Facet arthropathy, ligamentum flavum thickening. Mild canal stenosis, unchanged. Mild bilateral foraminal narrowing (right worse than left), unchanged. L3-L4: Diffuse disc bulge, mild uncoverage of posterior disc, narrowed bilateral subarticular zones, and contact of right L3 exiting nerve. Hypertrophic facet arthropathy, ligamentum flavum thickening. Moderate-to-severe canal stenosis, worsened. Mild   right foraminal narrowing, unchanged. L4-L5: Mild diffuse disc bulge, posterior marginal osteophytes. Facet arthropathy. Facet arthropathy. No significant canal stenosis, unchanged. Mild right foraminal narrowing, unchanged. L5-S1: Sacralized L5 vertebral body with bilateral assimilation joints. No significant canal stenosis or foraminal narrowing, unchanged. OTHER FINDINGS:  None. IMPRESSION:  Similar to slightly worsened multilevel degenerative changes of lumbar spine with transitional lumbosacral anatomy (sacralized L5 vertebral body with bilateral assimilation joints), varying degrees of canal stenosis (moderate-to-severe L3-L4) and foraminal narrowing (multilevel mild) as detailed above. Mild intramuscular edema in paraspinal lumbar musculature (right worse than left), likely due to muscular strain injury. The study was marked in EPIC for significant notification. 10/2/2023  LUMBAR SPINE     INDICATION:   M43.16: Spondylolisthesis, lumbar region. M54.16: Radiculopathy, lumbar region. S26.201: Spinal stenosis, lumbar region with neurogenic claudication. COMPARISON: Outside lumbar spine MRI from 6/4/2018 performed at [address] [place] [address]. VIEWS:  XR LUMBAR SP/BENDING ONLY MIN 2-3 V  Images: 4     FINDINGS:     There is grade 1 anterolisthesis of L2 on L1 by 4 mm. There is grade 1 retrolisthesis of L4 on L3 by 7 mm. This is similar to the prior MRI from 2018. There is no evidence of instability with flexion or extension. Normal mild lumbar lordosis is otherwise maintained.  No traumatic subluxation. 5 non-rib-bearing lumbar vertebrae. No vertebral body fractures. Multilevel degenerative changes. No definite lytic or sclerotic osseous lesions. Surgical clips project over the abdomen anterior to the L2 vertebral body. IMPRESSION:     No acute lumbar vertebral body fracture or traumatic subluxation. Chronic grade 1 anterolisthesis of L2 on L1 by 4 mm and grade 1 retrolisthesis of L4 on L3 by 7 mm, similar to prior outside MRI from 2018. No evidence of instability with flexion or extension. Multilevel degenerative changes. 5/29/2012 The femoral neck and total hip scores are osteopenia -1.2. Lumbar   spine scores are normal.  No evidence of osteopenia and/or osteoporosis. [facility] spine and pain procedure Result Date: 11/7/2023  Narrative: 2 Level Transforaminal Epidural Steroid Injection Indication: Low back and leg pain Preoperative diagnosis: Lumbar radiculitis Postoperative diagnosis: Lumbar radiculitis Procedure: Fluoroscopically-guided left L3 and L4 transforaminal epidural steroid injection under fluoroscopy   I have personally reviewed pertinent reports. and I have personally reviewed pertinent films in PACS    I have spent a total time of 40 minutes on 12/03/23 in caring for this patient including Diagnostic results, Risks and benefits of tx options, Instructions for management, Patient and family education, Importance of tx compliance, Risk factor reductions, Impressions, Counseling / Coordination of care, Documenting in the medical record, Reviewing / ordering tests, medicine, procedures  , Obtaining or reviewing history  , and Communicating with other healthcare professionals .

## 2023-12-02 NOTE — ASSESSMENT & PLAN NOTE
As addressed in HPI  Reports that she received an injection after her visit that helped about 50% and pain level decreased to about 2/10. Since that time she fell 2 days after the treatment and her pain has returned to baseline severity of 8/10. Today she reports the pain severity of 8/10. During the visit she reports left hip pain, upper leg pain to the knee, when walking she has numbness in the upper left leg and sometimes associated with tingling. She is a repeat interventional treatment schedule pending date for left BLANCA L3-L4. Patient denies red flag signs  Admits she does walk leaning forward and has noticed that this relieves her pain  also acknowledged that he observed her doing the same. Patient denies leaning on a shopping cart when in a grocery store etc.    Imaging  10/10/2023MRI lumbar spine Similar to slightly worsened multilevel degenerative changes of lumbar spine with transitional lumbosacral anatomy (sacralized L5 vertebral body with bilateral assimilation joints), varying degrees of canal stenosis (moderate-to-severe L3-L4) and foraminal narrowing (multilevel mild) as detailed above. Mild intramuscular edema in paraspinal lumbar musculature (right worse than left), likely due to muscular strain injury. The study was marked in EPIC for significant notification. 10/2/2023 x-ray lumbar spine---Chronic grade 1 anterolisthesis of L2 on L1 by 4 mm and grade 1 retrolisthesis of L4 on L3 by 7 mm, similar to prior outside MRI from 2018. No evidence of instability with flexion or extension. Multilevel degenerative changes.       Plan  Reviewed MRI lumbar spine extensively-demonstrates multilevel surgical pathology  Reviewed x-ray lumbar spine  Patient verbalized she is not interested in meeting with a surgeon as is not considering moving forward with surgical intervention at this time  Patient verbalized her desire to continue with multimodal conservative treatment HEP, and interventional treatments through pain management. Explained the expected therapeutic effect of both physical therapy and pain management/interventional treatments. Explained the benefit of HEP for muscle strengthening in the setting of  Mild dextroscoliosis of lumbar spine seen on MRI lumbar  Reviewed red flag signs advised if she develops to go immediately to her closest emergency room  Advised patient if she has additional questions or concerns to call/contact the neurosurgery office  Discussed osteopenia/osteoporosis and the need to discuss with her PCP moving forward with optimal treatment. Recommend patient follow-up with her PCP for an updated DEXA scan. Provided teaching information in AVS.  Recommend she continue with medicinal treatment gabapentin, and scheduled acetaminophen dosing. Advised against routine scheduled Advil dosing as the effect that NSAIDs have on bone growth and integrity as she already has osteopenia on a DEXA scan from 2021. Nicotine is a risk factor for osteoporosis. Both patient and  verbalized appreciation for visit and information provided.

## 2023-12-03 PROBLEM — F17.210 DEPENDENCE ON NICOTINE FROM CIGARETTES: Status: ACTIVE | Noted: 2023-12-03

## 2023-12-03 PROBLEM — M85.80 OSTEOPENIA: Status: ACTIVE | Noted: 2023-12-03

## 2023-12-03 PROBLEM — J44.9 COPD (CHRONIC OBSTRUCTIVE PULMONARY DISEASE) (HCC): Status: ACTIVE | Noted: 2023-12-03

## 2023-12-03 PROBLEM — R05.3 CHRONIC COUGH: Status: ACTIVE | Noted: 2023-12-03

## 2023-12-04 ENCOUNTER — TELEPHONE (OUTPATIENT)
Dept: RADIOLOGY | Facility: CLINIC | Age: 78
End: 2023-12-04

## 2023-12-04 NOTE — TELEPHONE ENCOUNTER
Caller: Vick Gallegos pt    Doctor: Jena Berry    Reason for call: call transferred to the     Call back#: [phone]

## 2023-12-04 NOTE — TELEPHONE ENCOUNTER
Caller: Jerardo Stallings PT    Doctor: Dr Janae Starr    Reason for call: Schedule a procedure     Call back#: [phone]

## 2023-12-05 NOTE — TELEPHONE ENCOUNTER
Late entry- spoke with patient yesterday and schedule procedure. Reviewed all instructions by phone upon scheduling.  Mailed copy to home

## 2023-12-26 ENCOUNTER — HOSPITAL ENCOUNTER (OUTPATIENT)
Dept: RADIOLOGY | Facility: CLINIC | Age: 78
Discharge: HOME | End: 2023-12-26
Admitting: ANESTHESIOLOGY
Payer: COMMERCIAL

## 2023-12-26 VITALS
OXYGEN SATURATION: 95 % | RESPIRATION RATE: 18 BRPM | DIASTOLIC BLOOD PRESSURE: 76 MMHG | TEMPERATURE: 97.5 F | HEART RATE: 98 BPM | SYSTOLIC BLOOD PRESSURE: 126 MMHG

## 2023-12-26 DIAGNOSIS — M54.16 LUMBAR RADICULOPATHY: ICD-10-CM

## 2023-12-26 PROCEDURE — 62323 NJX INTERLAMINAR LMBR/SAC: CPT | Performed by: ANESTHESIOLOGY

## 2023-12-26 RX ORDER — METHYLPREDNISOLONE ACETATE 80 MG/ML
80 INJECTION, SUSPENSION INTRA-ARTICULAR; INTRALESIONAL; INTRAMUSCULAR; PARENTERAL; SOFT TISSUE ONCE
Status: COMPLETED | OUTPATIENT
Start: 2023-12-26 | End: 2023-12-26

## 2023-12-26 RX ADMIN — IOHEXOL 1 ML: 300 INJECTION, SOLUTION INTRAVENOUS at 14:41

## 2023-12-26 RX ADMIN — METHYLPREDNISOLONE ACETATE 80 MG: 80 INJECTION, SUSPENSION INTRA-ARTICULAR; INTRALESIONAL; INTRAMUSCULAR; SOFT TISSUE at 14:42

## 2023-12-26 NOTE — DISCHARGE INSTRUCTIONS
Epidural Steroid Injection   WHAT YOU NEED TO KNOW:   An epidural steroid injection (BLANCA) is a procedure to inject steroid medicine into the epidural space. The epidural space is between your spinal cord and vertebrae. Steroids reduce inflammation and fluid buildup in your spine that may be causing pain. You may be given pain medicine along with the steroids.          ACTIVITY  Do not drive or operate machinery today.  No strenuous activity today - bending, lifting, etc.  You may resume normal activites starting tomorrow - start slowly and as tolerated.  You may shower today, but no tub baths or hot tubs.  You may have numbness for several hours from the local anesthetic. Please use caution and common sense, especially with weight-bearing activities.    CARE OF THE INJECTION SITE  If you have soreness or pain, apply ice to the area today (20 minutes on/20 minutes off).  Starting tomorrow, you may use warm, moist heat or ice if needed.  You may have an increase or change in your discomfort for 36-48 hours after your treatment.  Apply ice and continue with any pain medication you have been prescribed.  Notify the [facility] if you have any of the following: redness, drainage, swelling, headache, stiff neck or fever above 100°F.    SPECIAL INSTRUCTIONS  Our office will contact you in approximately 7 days for a progress report.    MEDICATIONS  Continue to take all routine medications.  Our office may have instructed you to hold some medications.    As no general anesthesia was used in today's procedure, you should not experience any side effects related to anesthesia.     If you are diabetic, the steroids used in today's injection may temporarily increase your blood sugar levels after the first few days after your injection. Please keep a close eye on your sugars and alert the doctor who manages your diabetes if your sugars are significantly high from your baseline or you are symptomatic.     If you have a  problem specifically related to your procedure, please call our office at [phone].  Problems not related to your procedure should be directed to your primary care physician.

## 2023-12-26 NOTE — H&P
History of Present Illness: The patient is a 78 y.o. female who presents with complaints of low back and leg pain.    History reviewed. No pertinent past medical history.    Past Surgical History:   Procedure Laterality Date    CATARACT EXTRACTION      GALLBLADDER SURGERY           Current Outpatient Medications:     Ascorbic Acid (VITAMIN C) 500 MG CAPS, Take by mouth Daily, Disp: , Rfl:     aspirin 81 MG tablet, Take 81 mg by mouth daily, Disp: , Rfl:     brompheniramine-pseudoephedrine-DM 30-2-10 MG/5ML syrup, Take 5 mL by mouth 3 (three) times a day as needed for allergies (Patient not taking: Reported on 12/1/2023), Disp: 120 mL, Rfl: 0    Cholecalciferol (VITAMIN D-3) 5000 units TABS, Take by mouth Daily, Disp: , Rfl:     CINNAMON PO, Take by mouth daily (Patient not taking: Reported on 10/2/2023), Disp: , Rfl:     fluticasone (FLONASE) 50 mcg/act nasal spray, 2 sprays into each nostril daily, Disp: , Rfl:     fluticasone-salmeterol (Advair) 250-50 mcg/dose inhaler, Inhale, Disp: , Rfl:     gabapentin (NEURONTIN) 300 mg capsule, Take 1 PO BID x 5 days, then 1 PO TID (Patient taking differently: Take 300 mg by mouth daily at bedtime Take 1 PO BID x 5 days, then 1 PO TID), Disp: 90 capsule, Rfl: 1    liraglutide (SAXENDA) injection, 3 mg Daily (Patient not taking: Reported on 12/1/2023), Disp: , Rfl:     loratadine-pseudoephedrine (CLARITIN-D 24-HOUR)  mg per 24 hr tablet, Take 1 tablet by mouth daily, Disp: , Rfl:     Magnesium 200 MG TABS, Take by mouth daily, Disp: , Rfl:     Menaquinone-7 (VITAMIN K2) 100 MCG CAPS, Take by mouth daily, Disp: , Rfl:     methylPREDNISolone 4 MG tablet therapy pack, Use as directed on package (Patient not taking: Reported on 12/1/2023), Disp: 1 each, Rfl: 0    Omega-3 1000 MG CAPS, Take by mouth daily, Disp: , Rfl:     Omega-3 Fatty Acids (Ultra Omega 3) 1000 MG CAPS, daily, Disp: , Rfl:     ondansetron (ZOFRAN) 8 mg tablet, Take 8 mg by mouth every 8 (eight) hours  (Patient not taking: Reported on 12/1/2023), Disp: , Rfl:     Probiotic Product (PROBIOTIC-10 PO), Daily, Disp: , Rfl:     raloxifene (EVISTA) 60 mg tablet, Take 60 mg by mouth daily, Disp: , Rfl:     vitamin B-12 (VITAMIN B-12) 500 mcg tablet, Take 500 mcg by mouth daily, Disp: , Rfl:     Vitamin E 400 units TABS, Take 400 mg by mouth Daily, Disp: , Rfl:     Allergies   Allergen Reactions    Amoxicillin-Pot Clavulanate Diarrhea    Molds & Smuts        Physical Exam:   Vitals:    12/26/23 1412   BP: 101/70   Pulse: 99   Resp: 16   Temp: 97.5 °F (36.4 °C)   SpO2: 98%     General: Awake, Alert, Oriented x 3, Mood and affect appropriate  Respiratory: Respirations even and unlabored  Cardiovascular: Peripheral pulses intact; no edema  Musculoskeletal Exam: Left lumbar paraspinals tender to palpation    ASA Score: 2    Patient/Chart Verification  Patient ID Verified: Verbal  ID Band Applied: No  Consents Confirmed: Procedural  H&P( within 30 days) Verified: To be obtained in the Pre-Procedure area  Interval H&P(within 24 hr) Complete (required for Outpatients and Surgery Admit only): To be obtained in the Pre-Procedure area  Allergies Reviewed: Yes  Anticoag/NSAID held?: No  Currently on antibiotics?: No  Pre-op Lab/Test Results Available: N/A  Pregnancy Lab Collected: N/A comment    Assessment:   1. Lumbar radiculopathy        Plan: Left L3-4 LESI

## 2023-12-29 DIAGNOSIS — M54.16 LUMBAR RADICULOPATHY: ICD-10-CM

## 2023-12-29 RX ORDER — GABAPENTIN 300 MG/1
CAPSULE ORAL
Qty: 270 CAPSULE | Refills: 1 | OUTPATIENT
Start: 2023-12-29

## 2024-01-02 ENCOUNTER — TELEPHONE (OUTPATIENT)
Dept: PAIN MEDICINE | Facility: CLINIC | Age: 79
End: 2024-01-02